# Patient Record
Sex: FEMALE | URBAN - METROPOLITAN AREA
[De-identification: names, ages, dates, MRNs, and addresses within clinical notes are randomized per-mention and may not be internally consistent; named-entity substitution may affect disease eponyms.]

---

## 2023-05-30 ENCOUNTER — TELEPHONE (OUTPATIENT)
Dept: NEUROLOGY | Facility: CLINIC | Age: 73
End: 2023-05-30

## 2023-05-30 NOTE — TELEPHONE ENCOUNTER
I called patient LVM to patient to call back to schedule appointment with Dr Logan in [facility]

## 2023-06-01 NOTE — TELEPHONE ENCOUNTER
Patient called back and spoke with me directly  Scheduled for 7/6 at 9am with Dr Bala Wakefield  She is going to bring her records with her however she does not have the records from [facility], but provided me with the fax number to submit the request  She is going to try to get the images from [facility] and bring them along to the appt so that Dr Bala Wakefield can review them  I will also send a request with the medical records request as well   Faxing request to [phone]

## 2023-06-01 NOTE — TELEPHONE ENCOUNTER
I called the patient to offer her an appt with Dr Burt Gamboa for next week  No answer  If the patient calls back please offer the spot on hold but patient MUST bring in records for Dr Burt Gamboa to be able to review as we have no information currently in the chart

## 2023-06-06 ENCOUNTER — OFFICE VISIT (OUTPATIENT)
Dept: NEUROLOGY | Facility: CLINIC | Age: 73
End: 2023-06-06
Payer: MEDICARE

## 2023-06-06 VITALS
BODY MASS INDEX: 43.15 KG/M2 | SYSTOLIC BLOOD PRESSURE: 181 MMHG | TEMPERATURE: 97 F | HEIGHT: 57 IN | DIASTOLIC BLOOD PRESSURE: 77 MMHG | WEIGHT: 200 LBS | HEART RATE: 85 BPM

## 2023-06-06 DIAGNOSIS — D83.9 COMMON VARIABLE IMMUNODEFICIENCY (HCC): ICD-10-CM

## 2023-06-06 DIAGNOSIS — I10 PRIMARY HYPERTENSION: ICD-10-CM

## 2023-06-06 DIAGNOSIS — I63.81 OTHER CEREBRAL INFARCTION DUE TO OCCLUSION OR STENOSIS OF SMALL ARTERY (HCC): ICD-10-CM

## 2023-06-06 DIAGNOSIS — R42 DIZZINESS AND GIDDINESS: ICD-10-CM

## 2023-06-06 DIAGNOSIS — E78.2 MIXED DYSLIPIDEMIA: ICD-10-CM

## 2023-06-06 DIAGNOSIS — Z86.73 HISTORY OF STROKE: Primary | ICD-10-CM

## 2023-06-06 PROBLEM — R26.9 NEUROLOGIC GAIT DYSFUNCTION: Status: ACTIVE | Noted: 2022-02-15

## 2023-06-06 PROCEDURE — 99205 OFFICE O/P NEW HI 60 MIN: CPT | Performed by: PSYCHIATRY & NEUROLOGY

## 2023-06-06 RX ORDER — PANTOPRAZOLE SODIUM 40 MG/1
TABLET, DELAYED RELEASE ORAL
COMMUNITY
Start: 2023-02-27

## 2023-06-06 RX ORDER — NISOLDIPINE 34 MG/1
34 TABLET, FILM COATED, EXTENDED RELEASE ORAL
COMMUNITY
Start: 2023-05-18

## 2023-06-06 RX ORDER — ASPIRIN 81 MG/1
81 TABLET, CHEWABLE ORAL DAILY
Qty: 30 TABLET | Refills: 0
Start: 2023-06-06

## 2023-06-06 RX ORDER — CHOLECALCIFEROL (VITAMIN D3) 25 MCG
CAPSULE ORAL
COMMUNITY

## 2023-06-06 RX ORDER — ATORVASTATIN CALCIUM 40 MG/1
40 TABLET, FILM COATED ORAL DAILY
Qty: 90 TABLET | Refills: 3 | Status: SHIPPED | OUTPATIENT
Start: 2023-06-06

## 2023-06-06 RX ORDER — COLESEVELAM 180 1/1
625 TABLET ORAL 2 TIMES DAILY
COMMUNITY
Start: 2023-03-21

## 2023-06-06 RX ORDER — LORAZEPAM 0.5 MG/1
TABLET ORAL
COMMUNITY

## 2023-06-06 RX ORDER — LEVOTHYROXINE SODIUM 175 UG/1
175 TABLET ORAL DAILY
COMMUNITY

## 2023-06-06 RX ORDER — FLUOXETINE HYDROCHLORIDE 20 MG/1
CAPSULE ORAL
COMMUNITY
Start: 2023-05-18

## 2023-06-06 RX ORDER — PREDNISONE 5 MG/1
5 TABLET ORAL
COMMUNITY

## 2023-06-06 RX ORDER — ATORVASTATIN CALCIUM 20 MG/1
20 TABLET, FILM COATED ORAL DAILY
COMMUNITY
Start: 2023-03-21 | End: 2023-06-06 | Stop reason: SDUPTHER

## 2023-06-06 RX ORDER — TRAMADOL HYDROCHLORIDE 50 MG/1
TABLET ORAL
COMMUNITY

## 2023-06-06 RX ORDER — IRBESARTAN 150 MG/1
150 TABLET ORAL DAILY
COMMUNITY

## 2023-06-06 RX ORDER — BACLOFEN 20 MG/1
20 TABLET ORAL
COMMUNITY

## 2023-06-06 RX ORDER — PRIMIDONE 50 MG/1
50 TABLET ORAL DAILY
COMMUNITY

## 2023-06-06 NOTE — PROGRESS NOTES
Patient ID: Edna Salgado is a 74 y o  female who presents to the [facility]  Assessment/Plan:   Patient Instructions   Stroke: Denisse Martins presents for an initial consultation with regard to an incidentally discovered stroke  This stroke was incidentally discovered on a recent head CT and I was able to look at that I directly compared to the CT scan from September 2022 and agree that it was already present and at this time is a stable  The stroke does appear as a scar, therefore it was already chronic in September 2022 and there is no clear way to know exactly how old the stroke is per se  The size and location is most consistent with a stroke due to small vessel ischemic disease which is related potentially to high cholesterol and high blood pressure  She does have a history of immunodeficient state and is on immune modulating medications that can increase the overall risk for stroke however I do not at this point in time to have specific evidence that the stroke was related to medication that she takes or to the immunodeficient state itself which can also increase the risk for blood clots to a degree  She also reports intermittent episodes of dizziness described as more of a head fullness/lightheaded sensation with a degree of depersonalization  This occurs most often with changes in position   -For stroke prevention at this point I would like her to begin aspirin 81 mg/day which is over-the-counter  Her most recent LDL cholesterol was 81 which is typically quite excellent but for someone with a history of stroke we would recommend an LDL cholesterol of less than 70  For that reason I have asked her to increase her Lipitor to 40 mg at this time and to repeat the cholesterol panel in 3 months    Note that she is currently in the midst of an adjustment to her blood pressure medication and she can wait to adjust the Lipitor until her blood pressure medication is stable and she knows how she is feeling  -In the future we will defer to her primary care team for monitoring of her cholesterol panel and blood sugar numbers and in addition to LDL of less than 70 would recommend hemoglobin A1c of less than 7%  -She should check her blood pressure once per day and overall we would recommend targeting a blood pressure of less than 130/80 most of the time  If the pressure is frequently higher than that she should work with her primary care team to slowly reduce it down  She should also remain well-hydrated while adjusting her blood pressure medications  -I will request a one-time carotid Doppler ultrasound initially to look for any evidence of carotid artery stenosis  -With regard to her dizziness, although it is possible that this is coming from her medications or her inner ears the lack of consistency from 1 week to the next, and the lack of vertigo, would suggest that this may be more of a pressure issue  I pressure issue causing symptoms like this can come from significantly elevated blood pressure, relatively low blood pressure, or abnormal heart rhythm  In addition to monitoring her blood pressure and the change in the frequency of her dizzy spells with the blood pressure medication I will request a Holter monitor at this time  -I will also provide her a referral to see one of the cardiologist at [facility]  -If she finds that she has persistent episodes of dizziness with no clear relation to blood pressure or heart rhythm we will consider vestibular rehabilitation in the future  -From a neurologic standpoint she would be cleared to have a cochlear implant performed at the discretion of her surgeon and in concert with her other physicians  -I would like for her to keep track of dizzy spells that she experiences    Because they cluster, keeping track of them using a calendar or note on her phone will help us to figure out if there are any specific patterns we can use diagnostically or therapeutically  I will plan for her to return to the office in 4 months time to see me directly but would be happy to see her sooner if the need should arise  If she has any symptoms concerning for TIA or stroke including sudden painless loss of vision or double vision, difficulty speaking or swallowing, vertigo/room spinning that does not quickly resolve, or weakness/numbness/loss of coordination affecting 1 side of the face or body she should proceed by ambulance to the nearest emergency room immediately  Diagnoses and all orders for this visit:    History of stroke  -     AMB extended holter monitor; Future  -     VAS carotid complete study; Future  -     atorvastatin (LIPITOR) 40 mg tablet; Take 1 tablet (40 mg total) by mouth daily  -     Lipid Panel with Direct LDL reflex; Future  -     aspirin 81 mg chewable tablet; Chew 1 tablet (81 mg total) daily  -     Ambulatory Referral to Cardiology; Future    Dizziness and giddiness  -     AMB extended holter monitor; Future  -     VAS carotid complete study; Future  -     Ambulatory Referral to Cardiology; Future    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)  -     VAS carotid complete study; Future    Primary hypertension    Common variable immunodeficiency (HCC)    Mixed dyslipidemia  -     atorvastatin (LIPITOR) 40 mg tablet; Take 1 tablet (40 mg total) by mouth daily  -     Lipid Panel with Direct LDL reflex; Future  -     aspirin 81 mg chewable tablet; Chew 1 tablet (81 mg total) daily    Other orders  -     Discontinue: atorvastatin (LIPITOR) 20 mg tablet; Take 20 mg by mouth daily  -     baclofen 20 mg tablet; Take 20 mg by mouth daily at bedtime  -     Cholecalciferol (Vitamin D High Potency) 25 MCG (1000 UT) capsule  -     colesevelam (WELCHOL) 625 mg tablet;  Take 625 mg by mouth 2 (two) times a day 3 tabs in the AM 3 tabs in the PM  -     FLUoxetine (PROzac) 20 mg capsule  -     irbesartan (AVAPRO) 150 mg tablet; Take 150 mg by mouth daily  -     levothyroxine 175 mcg tablet; Take 175 mcg by mouth daily  -     LORazepam (Ativan) 0 5 mg tablet  -     nisoldipine (SULAR) 34 MG 24 hr tablet; Take 34 mg by mouth daily at bedtime  -     pantoprazole (PROTONIX) 40 mg tablet  -     predniSONE 5 mg tablet; Take 5 mg by mouth  -     primidone (MYSOLINE) 50 mg tablet; Take 50 mg by mouth daily  -     traMADol (Ultram) 50 mg tablet; Take by mouth          Subjective:    ROHITH    Evelio Laguna is a 74 y  o  woman who presents for evaluation of stroke  She reports that she had a recent head CT upon which it was discovered that there was an old left frontal stroke  That stroke was not reported on at the time of her prior head CT in September 2022 however the radiologist on the new head CT confirmed that it was stable appearing compared with the prior study  I was able to personally review the 2 studies and agree that it was present and stable at the time of the prior study  Most likely this was not mentioned because it is reasonably subtle and quite chronic appearing  There is no change compared to the recent scan  We had an extensive discussion with regard to the physiology and pathophysiology of stroke  This stroke is of a size/position/orientation most consistent with small vessel ischemic disease  She does have typical small vessel risk factors  She is unable to have an MRI as a result of cochlear implants  She does have a history of immune deficiency for which reason she is on medication that could increase her thrombotic risk however at this point in time I do not have specific evidence to relate this stroke to that medication and ultimately it is entirely possible that this stroke predates her initiation of such medicine  She describes a history of episodes of dizziness  Episodes tend to cluster happening for a week or 2 and then resolving for a week or 2    They do not include a vertigo component but "more typically a sense of fullness and pressure in the head and a degree of depersonalization  She tends to sit down when they occur and they may be relatively short-lived  They have occasionally happened while sitting but not often  They do not happen when laying down  She also reports episodes of occasionally feeling shaky or [place] which resolve with a snack  She does not currently carry a diagnosis of diabetes  She reports that she lost her  to a glioblastoma and is currently living alone  History reviewed  No pertinent past medical history  Current Outpatient Medications:   •  aspirin 81 mg chewable tablet, Chew 1 tablet (81 mg total) daily, Disp: 30 tablet, Rfl: 0  •  atorvastatin (LIPITOR) 40 mg tablet, Take 1 tablet (40 mg total) by mouth daily, Disp: 90 tablet, Rfl: 3  •  baclofen 20 mg tablet, Take 20 mg by mouth daily at bedtime, Disp: , Rfl:   •  Cholecalciferol (Vitamin D High Potency) 25 MCG (1000 UT) capsule, , Disp: , Rfl:   •  colesevelam (WELCHOL) 625 mg tablet, Take 625 mg by mouth 2 (two) times a day 3 tabs in the AM 3 tabs in the PM, Disp: , Rfl:   •  FLUoxetine (PROzac) 20 mg capsule, , Disp: , Rfl:   •  irbesartan (AVAPRO) 150 mg tablet, Take 150 mg by mouth daily, Disp: , Rfl:   •  levothyroxine 175 mcg tablet, Take 175 mcg by mouth daily, Disp: , Rfl:   •  LORazepam (Ativan) 0 5 mg tablet, , Disp: , Rfl:   •  nisoldipine (SULAR) 34 MG 24 hr tablet, Take 34 mg by mouth daily at bedtime, Disp: , Rfl:   •  pantoprazole (PROTONIX) 40 mg tablet, , Disp: , Rfl:   •  predniSONE 5 mg tablet, Take 5 mg by mouth, Disp: , Rfl:   •  primidone (MYSOLINE) 50 mg tablet, Take 50 mg by mouth daily, Disp: , Rfl:   •  traMADol (Ultram) 50 mg tablet, Take by mouth, Disp: , Rfl:      Objective:    Blood pressure (!) 181/77, pulse 85, temperature (!) 97 °F (36 1 °C), temperature source Temporal, height 4' 9\" (1 448 m), weight 90 7 kg (200 lb)      Neurological Exam    At the time of my " examination she was awake, alert, and in no distress  There were no clear cranial neuropathies  She has a tremor affecting the head more than the arms her speech is not hypophonic or bradykinetic  She was able to rise easily without assistance, manipulate her phone, and ambulate with the assistance of her cane  There is no obvious deficit of extraocular movement  Upon feeling somewhat symptomatic with regard to dizziness her blood pressure was checked and was 182/84 sitting with a pulse of approximately 75 and around 178/75 standing with a pulse of approximately 79  ROS:    Review of Systems   Constitutional: Negative  Negative for appetite change and fever  HENT: Negative  Negative for hearing loss, tinnitus, trouble swallowing and voice change  Eyes: Negative  Negative for photophobia, pain and visual disturbance  Respiratory: Negative  Negative for shortness of breath  Cardiovascular: Negative  Negative for palpitations  Gastrointestinal: Negative  Negative for nausea and vomiting  Endocrine: Negative  Negative for cold intolerance  Genitourinary: Negative  Negative for dysuria, frequency and urgency  Musculoskeletal: Negative  Negative for gait problem, myalgias and neck pain  Skin: Negative  Negative for rash  Allergic/Immunologic: Negative  Neurological: Positive for dizziness, tremors, light-headedness and headaches  Negative for seizures, syncope, facial asymmetry, speech difficulty, weakness and numbness  Hematological: Negative  Does not bruise/bleed easily  Psychiatric/Behavioral: Negative  Negative for confusion, hallucinations and sleep disturbance         I have spent a total time of 76 minutes on 06/06/23 in caring for this patient including Diagnostic results, Prognosis, Risks and benefits of tx options, Instructions for management, Patient and family education, Importance of tx compliance, Risk factor reductions, Impressions, Counseling / Coordination of care, Documenting in the medical record, Reviewing / ordering tests, medicine, procedures   and Obtaining or reviewing history

## 2023-06-06 NOTE — PATIENT INSTRUCTIONS
Stroke: Urvashi Bhatia presents for an initial consultation with regard to an incidentally discovered stroke  This stroke was incidentally discovered on a recent head CT and I was able to look at that I directly compared to the CT scan from September 2022 and agree that it was already present and at this time is a stable  The stroke does appear as a scar, therefore it was already chronic in September 2022 and there is no clear way to know exactly how old the stroke is per se  The size and location is most consistent with a stroke due to small vessel ischemic disease which is related potentially to high cholesterol and high blood pressure  She does have a history of immunodeficient state and is on immune modulating medications that can increase the overall risk for stroke however I do not at this point in time to have specific evidence that the stroke was related to medication that she takes or to the immunodeficient state itself which can also increase the risk for blood clots to a degree  She also reports intermittent episodes of dizziness described as more of a head fullness/lightheaded sensation with a degree of depersonalization  This occurs most often with changes in position   -For stroke prevention at this point I would like her to begin aspirin 81 mg/day which is over-the-counter  Her most recent LDL cholesterol was 81 which is typically quite excellent but for someone with a history of stroke we would recommend an LDL cholesterol of less than 70  For that reason I have asked her to increase her Lipitor to 40 mg at this time and to repeat the cholesterol panel in 3 months    Note that she is currently in the midst of an adjustment to her blood pressure medication and she can wait to adjust the Lipitor until her blood pressure medication is stable and she knows how she is feeling  -In the future we will defer to her primary care team for monitoring of her cholesterol panel and blood sugar numbers and in addition to LDL of less than 70 would recommend hemoglobin A1c of less than 7%  -She should check her blood pressure once per day and overall we would recommend targeting a blood pressure of less than 130/80 most of the time  If the pressure is frequently higher than that she should work with her primary care team to slowly reduce it down  She should also remain well-hydrated while adjusting her blood pressure medications  -I will request a one-time carotid Doppler ultrasound initially to look for any evidence of carotid artery stenosis  -With regard to her dizziness, although it is possible that this is coming from her medications or her inner ears the lack of consistency from 1 week to the next, and the lack of vertigo, would suggest that this may be more of a pressure issue  I pressure issue causing symptoms like this can come from significantly elevated blood pressure, relatively low blood pressure, or abnormal heart rhythm  In addition to monitoring her blood pressure and the change in the frequency of her dizzy spells with the blood pressure medication I will request a Holter monitor at this time  -I will also provide her a referral to see one of the cardiologist at [facility]  -If she finds that she has persistent episodes of dizziness with no clear relation to blood pressure or heart rhythm we will consider vestibular rehabilitation in the future  -From a neurologic standpoint she would be cleared to have a cochlear implant performed at the discretion of her surgeon and in concert with her other physicians  -I would like for her to keep track of dizzy spells that she experiences  Because they cluster, keeping track of them using a calendar or note on her phone will help us to figure out if there are any specific patterns we can use diagnostically or therapeutically      I will plan for her to return to the office in 4 months time to see me directly but would be happy to see her sooner if the need should arise  If she has any symptoms concerning for TIA or stroke including sudden painless loss of vision or double vision, difficulty speaking or swallowing, vertigo/room spinning that does not quickly resolve, or weakness/numbness/loss of coordination affecting 1 side of the face or body she should proceed by ambulance to the nearest emergency room immediately

## 2023-06-23 ENCOUNTER — TELEPHONE (OUTPATIENT)
Dept: NEUROLOGY | Facility: CLINIC | Age: 73
End: 2023-06-23

## 2023-06-23 NOTE — TELEPHONE ENCOUNTER
Patient called looking for Fabricio Griffin in regards to an update on the holter monitor Dr Jacky Burch had ordered for her      Please assist

## 2023-06-26 ENCOUNTER — TELEPHONE (OUTPATIENT)
Dept: NEUROLOGY | Facility: CLINIC | Age: 73
End: 2023-06-26

## 2023-06-26 NOTE — TELEPHONE ENCOUNTER
----- Message from Efe Fox RN sent at 6/21/2023  1:18 PM EDT -----  Regarding: FW: Holter monitor  Contact: [phone]    ----- Message -----  From: Monika Muñoz  Sent: 6/21/2023  12:48 PM EDT  To: Neurology [address] Clinical Team 5  Subject: Holter monitor                                   Dr Kandice Jimenez, it was a francis meeting you  My blood pressure is 134/72 thanks to you! However, I have not heard anything about the holter monitor  How do I get it? Thanks for your help         Virgil Simpson

## 2023-06-26 NOTE — TELEPHONE ENCOUNTER
Returned pt's call regarding pt's message below  Left a detailed message advising Dr Ricky Nicole placed a order for an extended Holter Monitor  Left the number for Central Scheduling        Sent a [employer] message

## 2023-06-26 NOTE — TELEPHONE ENCOUNTER
Patient called into the office and spoke with me directly  She stated she is trying to schedule the holter monitor that Dr Hakan Massey ordered but central scheduling does not see the order  I did check the charts and called central scheduling with the patient on the line  The rep stated that the order was for cardiology and the patient would need to call cardiology and set up an appt with them and they do the holter monitor testing thru their office  Provided the number [phone] for the patient to schedule with cardiology  Patient was also asking if she should still get the Echo that was ordered by her other cardiologist and is scheduled on August 9th  Advised the patient that we currently do not have an echo ordered for her and if she would want to get the testing done thru [facility] we would have to have an order to schedule and it may be scheduled later than what she already has scheduled  Informed the patient if she would like to get the testing done as scheduled already she can have them send the results to us or bring them to her next visit

## 2023-06-27 ENCOUNTER — TELEPHONE (OUTPATIENT)
Dept: NEUROLOGY | Facility: CLINIC | Age: 73
End: 2023-06-27

## 2023-06-27 NOTE — TELEPHONE ENCOUNTER
Ilean Primrose called back and I spoke with her directly  Informed of which images are needed  CT Head 5/24/2023 and CT Head/Brain/C spine from 9/8/2022  Sent a new request via Fax for [employer]

## 2023-06-27 NOTE — TELEPHONE ENCOUNTER
Fax found in [address] from [employer] from a fax that I had sent on 6/1/2023 for the patient to obtain images  Fax was illegible and radiology unable to read  I did call the number on the forms and spoke with Amber Coreas who stated that I would need to talk to [facility] to have the images sent to us  She took my name and direct number and stated she would have [facility] call me back later today

## 2023-07-07 ENCOUNTER — TELEPHONE (OUTPATIENT)
Dept: NEUROLOGY | Facility: CLINIC | Age: 73
End: 2023-07-07

## 2023-07-07 NOTE — TELEPHONE ENCOUNTER
RENETTA left at 12:33, shashank'd at 16:06:    Hi this is Maddie Gutierrez. I am a patient of Dr. Ramos's. He is treating me for history of Stroke.  My YOB: 1950, and I'm not feeling well today. A little bit Short of Breath. My blood pressure is 134/54. I just like to have a nurse call me. Thank you.   Oh, and I'm kind of pale, and my gums are white. If that means anything thanks, bye.   # [phone]    I called patient back, I reached her Answering Machine.  I left message advising patient  go to ED or call 911 due to her history and present symptoms.     otis-Dr. Ramos

## 2023-07-27 ENCOUNTER — TELEPHONE (OUTPATIENT)
Dept: NEUROLOGY | Facility: CLINIC | Age: 73
End: 2023-07-27

## 2023-07-27 NOTE — TELEPHONE ENCOUNTER
Pt called in to let Dr. Lary Pool know she is going for heart surgery tomorrow and wanted to know if she needed to be seen sooner or if Dr. Lary Pool wanted to change her plan of action moving forward but also stated she wouldn't know more till after her surgery. I advised her to have the records sent over for him to review, and to give us a call again when she is feeling better to give us more information and we can see if we need to move the appointment.

## 2023-08-01 ENCOUNTER — TELEPHONE (OUTPATIENT)
Dept: NEUROLOGY | Facility: CLINIC | Age: 73
End: 2023-08-01

## 2023-08-01 NOTE — TELEPHONE ENCOUNTER
LMOM for patient to call us back to reschedule her appt with Jett Galicia on 10/9 because he will not be in the office that day.

## 2023-08-02 ENCOUNTER — TELEPHONE (OUTPATIENT)
Dept: NEUROLOGY | Facility: CLINIC | Age: 73
End: 2023-08-02

## 2023-08-02 NOTE — TELEPHONE ENCOUNTER
Received a call from patient saying she had emergency heart surgery on 7/28 and needs to know if Dr. Tahir Palmer wants to see her sooner. She also mentioned there was several tests/consults he ordered and wants to talk to someone about that.

## 2023-08-08 NOTE — TELEPHONE ENCOUNTER
So likely we will need some information about what the procedure is and what they are planning for her post-operatively.  Typically I would expect any med changes related to protection during and after cardiac surgery like this to be appropriate for stroke protection in most cases at least.     Likely would not require a change in her appointment unless there is some neurologic symptoms following her surgery.

## 2023-08-08 NOTE — TELEPHONE ENCOUNTER
Reviewed chart. There are two encounters in Epic regarding this task. In the telephone note from 7/27/23, Dr. Jett Galicia responded:  "So likely we will need some information about what the procedure is and what they are planning for her post-operatively.  Typically I would expect any med changes related to protection during and after cardiac surgery like this to be appropriate for stroke protection in most cases at least.     Likely would not require a change in her appointment unless there is some neurologic symptoms following her surgery."     Called patient to obtain more information and to relay Dr. James Chris response. Patient did not answer. Left a voice message requesting for a return call. Provided the office's phone number.

## 2023-08-09 NOTE — TELEPHONE ENCOUNTER
Returned call to the patient. No answer. Left a voice message requesting for a return call to provide more detailed requiring the cardiac surgery.  Provided the office's phone number

## 2023-08-09 NOTE — TELEPHONE ENCOUNTER
Recd pete 8/8 taken off 8/9   my name is [address]  [birth year]. I got a call from I believe  Etta Correa to call you back, I guess regarding my emergency heart surgery.  So if someone give me a call back,   cb [phone]

## 2023-08-11 NOTE — TELEPHONE ENCOUNTER
Recd vm 8-10-23 3:38pm; Taken off 8-11-23 8::15am    Patient returning our call. Please call her back.     Cb# [phone]

## 2023-08-14 NOTE — TELEPHONE ENCOUNTER
Called patient. No answer. Left a voice message requesting for a return call. Provided the office's phone number.

## 2023-08-17 ENCOUNTER — TELEPHONE (OUTPATIENT)
Dept: NEUROLOGY | Facility: CLINIC | Age: 73
End: 2023-08-17

## 2023-09-05 ENCOUNTER — PATIENT MESSAGE (OUTPATIENT)
Dept: NEUROLOGY | Facility: CLINIC | Age: 73
End: 2023-09-05

## 2023-09-11 ENCOUNTER — HOSPITAL ENCOUNTER (OUTPATIENT)
Dept: NON INVASIVE DIAGNOSTICS | Facility: CLINIC | Age: 73
Discharge: HOME | End: 2023-09-11
Payer: MEDICARE

## 2023-09-11 DIAGNOSIS — I63.81 OTHER CEREBRAL INFARCTION DUE TO OCCLUSION OR STENOSIS OF SMALL ARTERY (HCC): ICD-10-CM

## 2023-09-11 DIAGNOSIS — Z86.73 HISTORY OF STROKE: ICD-10-CM

## 2023-09-11 DIAGNOSIS — R42 DIZZINESS AND GIDDINESS: ICD-10-CM

## 2023-09-11 PROCEDURE — 93880 EXTRACRANIAL BILAT STUDY: CPT

## 2023-09-11 PROCEDURE — 93880 EXTRACRANIAL BILAT STUDY: CPT | Performed by: SURGERY

## 2023-09-13 NOTE — RESULT ENCOUNTER NOTE
LETICIA Ames Sun,    Your carotid arteries look great! No narrowing or plaque!     Thanks!    -Dr Ricardo Denise

## 2023-09-14 ENCOUNTER — HOSPITAL ENCOUNTER (EMERGENCY)
Facility: HOSPITAL | Age: 73
Discharge: HOME | End: 2023-09-15
Attending: EMERGENCY MEDICINE
Payer: MEDICARE

## 2023-09-14 ENCOUNTER — APPOINTMENT (EMERGENCY)
Dept: CT IMAGING | Facility: HOSPITAL | Age: 73
End: 2023-09-14
Payer: MEDICARE

## 2023-09-14 DIAGNOSIS — R19.7 DIARRHEA: Primary | ICD-10-CM

## 2023-09-14 LAB
ALBUMIN SERPL BCP-MCNC: 4.6 G/DL (ref 3.5–5)
ALP SERPL-CCNC: 86 U/L (ref 34–104)
ALT SERPL W P-5'-P-CCNC: 22 U/L (ref 7–52)
ANION GAP SERPL CALCULATED.3IONS-SCNC: 7 MMOL/L
AST SERPL W P-5'-P-CCNC: 25 U/L (ref 13–39)
BASOPHILS # BLD AUTO: 0.07 THOUSANDS/ÂΜL (ref 0–0.1)
BASOPHILS NFR BLD AUTO: 1 % (ref 0–1)
BILIRUB SERPL-MCNC: 0.49 MG/DL (ref 0.2–1)
BUN SERPL-MCNC: 16 MG/DL (ref 5–25)
CALCIUM SERPL-MCNC: 11.3 MG/DL (ref 8.4–10.2)
CHLORIDE SERPL-SCNC: 105 MMOL/L (ref 96–108)
CO2 SERPL-SCNC: 26 MMOL/L (ref 21–32)
CREAT SERPL-MCNC: 0.74 MG/DL (ref 0.6–1.3)
EOSINOPHIL # BLD AUTO: 0.16 THOUSAND/ÂΜL (ref 0–0.61)
EOSINOPHIL NFR BLD AUTO: 2 % (ref 0–6)
ERYTHROCYTE [DISTWIDTH] IN BLOOD BY AUTOMATED COUNT: 17.4 % (ref 11.6–15.1)
GFR SERPL CREATININE-BSD FRML MDRD: 80 ML/MIN/1.73SQ M
GLUCOSE SERPL-MCNC: 91 MG/DL (ref 65–140)
HCT VFR BLD AUTO: 38.1 % (ref 34.8–46.1)
HGB BLD-MCNC: 12.3 G/DL (ref 11.5–15.4)
IMM GRANULOCYTES # BLD AUTO: 0.02 THOUSAND/UL (ref 0–0.2)
IMM GRANULOCYTES NFR BLD AUTO: 0 % (ref 0–2)
LIPASE SERPL-CCNC: 23 U/L (ref 11–82)
LYMPHOCYTES # BLD AUTO: 2.68 THOUSANDS/ÂΜL (ref 0.6–4.47)
LYMPHOCYTES NFR BLD AUTO: 26 % (ref 14–44)
MAGNESIUM SERPL-MCNC: 1.9 MG/DL (ref 1.9–2.7)
MCH RBC QN AUTO: 27.9 PG (ref 26.8–34.3)
MCHC RBC AUTO-ENTMCNC: 32.3 G/DL (ref 31.4–37.4)
MCV RBC AUTO: 86 FL (ref 82–98)
MONOCYTES # BLD AUTO: 0.79 THOUSAND/ÂΜL (ref 0.17–1.22)
MONOCYTES NFR BLD AUTO: 8 % (ref 4–12)
NEUTROPHILS # BLD AUTO: 6.68 THOUSANDS/ÂΜL (ref 1.85–7.62)
NEUTS SEG NFR BLD AUTO: 63 % (ref 43–75)
NRBC BLD AUTO-RTO: 0 /100 WBCS
PLATELET # BLD AUTO: 260 THOUSANDS/UL (ref 149–390)
PMV BLD AUTO: 9.7 FL (ref 8.9–12.7)
POTASSIUM SERPL-SCNC: 3.9 MMOL/L (ref 3.5–5.3)
PROT SERPL-MCNC: 7.5 G/DL (ref 6.4–8.4)
RBC # BLD AUTO: 4.41 MILLION/UL (ref 3.81–5.12)
SODIUM SERPL-SCNC: 138 MMOL/L (ref 135–147)
WBC # BLD AUTO: 10.4 THOUSAND/UL (ref 4.31–10.16)

## 2023-09-14 PROCEDURE — 74176 CT ABD & PELVIS W/O CONTRAST: CPT

## 2023-09-14 PROCEDURE — 80053 COMPREHEN METABOLIC PANEL: CPT | Performed by: EMERGENCY MEDICINE

## 2023-09-14 PROCEDURE — 99284 EMERGENCY DEPT VISIT MOD MDM: CPT | Performed by: EMERGENCY MEDICINE

## 2023-09-14 PROCEDURE — 93005 ELECTROCARDIOGRAM TRACING: CPT

## 2023-09-14 PROCEDURE — 36415 COLL VENOUS BLD VENIPUNCTURE: CPT | Performed by: EMERGENCY MEDICINE

## 2023-09-14 PROCEDURE — 99284 EMERGENCY DEPT VISIT MOD MDM: CPT

## 2023-09-14 PROCEDURE — 83690 ASSAY OF LIPASE: CPT | Performed by: EMERGENCY MEDICINE

## 2023-09-14 PROCEDURE — 85025 COMPLETE CBC W/AUTO DIFF WBC: CPT | Performed by: EMERGENCY MEDICINE

## 2023-09-14 PROCEDURE — 96360 HYDRATION IV INFUSION INIT: CPT

## 2023-09-14 PROCEDURE — 83735 ASSAY OF MAGNESIUM: CPT | Performed by: EMERGENCY MEDICINE

## 2023-09-14 RX ADMIN — SODIUM CHLORIDE 1000 ML: 0.9 INJECTION, SOLUTION INTRAVENOUS at 18:44

## 2023-09-14 NOTE — ED PROVIDER NOTES
Pt Name: Cathy Weeks  MRN: [id number]  [address] [place] 1950  Age/Sex: 74 y.o. female  Date of evaluation: 9/14/2023  PCP: Shailesh Chavez MD    [address]    Chief Complaint   Patient presents with   • Diarrhea     Diarrhea on going for 10 days, dizziness, nausea, unable to eat anything. Sent in by PCP for evaluation         HPI and MDM    74 y.o. female presenting with diarrhea for the last 10 days. Patient states she is having lightheadedness, dizziness, nausea. No vomiting. Poor appetite. States every time she eats she gets diarrhea. Denies any blood in her stool. No abdominal pain. No fevers or chills, no recent traveling. States she took 3 Imodium to get to the hospital.      Differential diagnosis considered includes but not limited to electrolyte abnormalities, dehydration, acute kidney injury, diverticulitis, colitis, infectious diarrhea. Per my independent interpretation of EKG, normal sinus rhythm heart of 73, narrow QRS, normal axis, intervals reassuring, no STEMI. Blood work is overall reassuring, mildly elevated calcium level, likely due to dehydration, patient received IV fluids. CT scan results as below. She was updated with results. CT findings were concerning for panniculitis, however upon direct inspection of the region, no tenderness or erythema or signs of inflammation were noted. No signs of infection. Patient unable to provide stool sample while in the emergency department. Advised continued supportive care, close PCP follow-up, return precaution discussed, patient verbalized understanding and is in agreement with plan.           Medications   sodium chloride 0.9 % bolus 1,000 mL (1,000 mL Intravenous New Bag 9/14/23 2344)         Past Medical and Surgical History    Past Medical History:   Diagnosis Date   • Hypothyroid        Past Surgical History:   Procedure Laterality Date   • FL MYELOGRAM CERVICAL  07/16/2015   • FL MYELOGRAM CERVICAL  06/17/2013   • REPLACEMENT AORTIC VALVE TRANSCATHETER (TAVR) N/A 07/28/2023       Family History   Problem Relation Age of Onset   • Heart disease Mother    • Heart disease Father    • Stroke Father    • Heart disease Brother    • Heart disease Maternal Grandmother    • Heart disease Paternal Grandfather        Social History     Tobacco Use   • Smoking status: Former     Types: Cigarettes   Vaping Use   • Vaping Use: Never used   Substance Use Topics   • Alcohol use: Yes     Comment: social   • Drug use: Never           Allergies    Allergies   Allergen Reactions   • Alcohol Gel Base - Food Allergy Swelling   • Infliximab Shortness Of Breath   • Iodine - Food Allergy Hives and Itching     Any kind of Iodine  Topical iodine     • Tobramycin Swelling     Eye drops  Eye drops  Eyes turn bright red  Eye drops  Eyes turn bright red  Eye drops     • Other Hives   • Shellfish Allergy - Food Allergy Hives     Nausea and abdominal pain   • Sulfa Antibiotics Hives   • Sulfamethoxazole-Trimethoprim Hives       Home Medications    Prior to Admission medications    Medication Sig Start Date End Date Taking?  Authorizing Provider   aspirin 81 mg chewable tablet Chew 1 tablet (81 mg total) daily 6/6/23   Trever Martinez MD   atorvastatin (LIPITOR) 40 mg tablet Take 1 tablet (40 mg total) by mouth daily 6/6/23   Trever Martinez MD   baclofen 20 mg tablet Take 20 mg by mouth daily at bedtime    Historical Provider, MD   Cholecalciferol (Vitamin D High Potency) 25 MCG (1000 UT) capsule     Historical Provider, MD [facility]) 625 mg tablet Take 625 mg by mouth 2 (two) times a day 3 tabs in the AM 3 tabs in the PM 3/21/23   Historical Provider, MD   FLUoxetine (PROzac) 20 mg capsule  5/18/23   Historical Provider, MD   irbesartan (AVAPRO) 150 mg tablet Take 150 mg by mouth daily    Historical Provider, MD   levothyroxine 175 mcg tablet Take 175 mcg by mouth daily    Historical Provider, MD   LORazepam (Ativan) 0.5 mg tablet Historical Provider, MD   nisoldipine (SULAR) 34 MG 24 hr tablet Take 34 mg by mouth daily at bedtime 5/18/23   Historical Provider, MD   pantoprazole (PROTONIX) 40 mg tablet  2/27/23   Historical Provider, MD   predniSONE 5 mg tablet Take 5 mg by mouth    Historical Provider, MD   primidone (MYSOLINE) 50 mg tablet Take 50 mg by mouth daily    Historical Provider, MD   traMADol (Ultram) 50 mg tablet Take by mouth    Historical Provider, MD           Physical Exam      ED Triage Vitals   Temperature Pulse Respirations Blood Pressure SpO2   09/14/23 1826 09/14/23 1825 09/14/23 1825 09/14/23 1825 09/14/23 1825   (!) 97.4 °F (36.3 °C) 95 18 154/70 96 %      Temp Source Heart Rate Source Patient Position - Orthostatic VS BP Location FiO2 (%)   09/14/23 1826 09/14/23 1825 09/14/23 1825 09/14/23 1825 --   Oral Monitor Sitting Right arm       Pain Score       --                      Physical Exam  Constitutional:       General: She is not in acute distress. Appearance: She is not ill-appearing. HENT:      Head: Normocephalic and atraumatic. Nose: Nose normal.      Mouth/Throat:      Mouth: Mucous membranes are dry. Eyes:      Extraocular Movements: Extraocular movements intact. Pupils: Pupils are equal, round, and reactive to light. Cardiovascular:      Rate and Rhythm: Normal rate and regular rhythm. Pulmonary:      Effort: No respiratory distress. Breath sounds: Normal breath sounds. No wheezing. Abdominal:      General: There is no distension. Palpations: Abdomen is soft. Tenderness: There is no abdominal tenderness. Musculoskeletal:         General: No deformity. Cervical back: Normal range of motion and neck supple. Skin:     General: Skin is warm. Findings: No erythema. Neurological:      Mental Status: She is alert and oriented to person, place, and time. Mental status is at baseline.               Diagnostic Results      Labs:    Results Reviewed     Procedure Component Value Units Date/Time    Comprehensive metabolic panel [[id number]]  (Abnormal) Collected: 09/14/23 1845    Lab Status: Final result Specimen: Blood from Arm, Left Updated: 09/14/23 1922     Sodium 138 mmol/L      Potassium 3.9 mmol/L      Chloride 105 mmol/L      CO2 26 mmol/L      ANION GAP 7 mmol/L      BUN 16 mg/dL      Creatinine 0.74 mg/dL      Glucose 91 mg/dL      Calcium 11.3 mg/dL      AST 25 U/L      ALT 22 U/L      Alkaline Phosphatase 86 U/L      Total Protein 7.5 g/dL      Albumin 4.6 g/dL      Total Bilirubin 0.49 mg/dL      eGFR 80 ml/min/1.73sq m     Narrative:      National Kidney Disease Foundation guidelines for Chronic Kidney Disease (CKD):   •  Stage 1 with normal or high GFR (GFR > 90 mL/min/1.73 square meters)  •  Stage 2 Mild CKD (GFR = 60-89 mL/min/1.73 square meters)  •  Stage 3A Moderate CKD (GFR = 45-59 mL/min/1.73 square meters)  •  Stage 3B Moderate CKD (GFR = 30-44 mL/min/1.73 square meters)  •  Stage 4 Severe CKD (GFR = 15-29 mL/min/1.73 square meters)  •  Stage 5 End Stage CKD (GFR <15 mL/min/1.73 square meters)  Note: GFR calculation is accurate only with a steady state creatinine    Magnesium [[id number]]  (Normal) Collected: 09/14/23 1845    Lab Status: Final result Specimen: Blood from Arm, Left Updated: 09/14/23 1922     Magnesium 1.9 mg/dL     Lipase [[id number]]  (Normal) Collected: 09/14/23 1845    Lab Status: Final result Specimen: Blood from Arm, Left Updated: 09/14/23 1922     Lipase 23 u/L     CBC and differential [[id number]]  (Abnormal) Collected: 09/14/23 1845    Lab Status: Final result Specimen: Blood from Arm, Left Updated: 09/14/23 1853     WBC 10.40 Thousand/uL      RBC 4.41 Million/uL      Hemoglobin 12.3 g/dL      Hematocrit 38.1 %      MCV 86 fL      MCH 27.9 pg      MCHC 32.3 g/dL      RDW 17.4 %      MPV 9.7 fL      Platelets 706 Thousands/uL      nRBC 0 /100 WBCs      Neutrophils Relative 63 %      Immat GRANS % 0 %      Lymphocytes Relative 26 % Monocytes Relative 8 %      Eosinophils Relative 2 %      Basophils Relative 1 %      Neutrophils Absolute 6.68 Thousands/µL      Immature Grans Absolute 0.02 Thousand/uL      Lymphocytes Absolute 2.68 Thousands/µL      Monocytes Absolute 0.79 Thousand/µL      Eosinophils Absolute 0.16 Thousand/µL      Basophils Absolute 0.07 Thousands/µL     UA (URINE) with reflex to Scope [[id number]]     Lab Status: No result Specimen: Urine     Clostridium difficile toxin by PCR with EIA [[id number]]     Lab Status: No result Specimen: Stool from Per Rectum     Stool Enteric Bacterial Panel by PCR [[id number]]     Lab Status: No result Specimen: Stool           All labs reviewed and utilized in the medical decision making process    Radiology:    CT abdomen pelvis wo contrast   Final Result      Gastric wall thickening, which can be seen in the setting of gastritis. CT findings suggestive of panniculitis. Correlate with direct inspection of the inferior abdominal wall            Workstation performed: [id number]             All radiology studies independently viewed by me and interpreted by the radiologist.    Procedure    Procedures        FINAL IMPRESSION    Final diagnoses:   Diarrhea         DISPOSITION    Time reflects when diagnosis was documented in both MDM as applicable and the Disposition within this note     Time User Action Codes Description Comment    9/15/2023 12:12 AM Chandler Barajas Add [R19.7] Diarrhea       ED Disposition     ED Disposition   Discharge    Condition   Stable    Date/Time   Fri Sep 15, 2023 12:12 AM    Comment   Kit Jefferson discharge to home/self care.                Follow-up Information     Follow up With Specialties Details Why Contact Info    Gifty Gonzalez MD  Call in 1 day  [address] [facility]  [phone]              PATIENT REFERRED TO:    Gifty Gonzalez MD  [address] [place] [address]  [phone]    Call in 1 day        DISCHARGE MEDICATIONS:    Patient's Medications   Discharge Prescriptions    No medications on file       No discharge procedures on file. Laly Lemus DO        This note was partially completed using voice recognition technology, and was scanned for gross errors; however some errors may still exist. Please contact the author with any questions or requests for clarification.       Laly Lemus DO  09/15/23 8001 Patient

## 2023-09-15 VITALS
TEMPERATURE: 97.4 F | RESPIRATION RATE: 20 BRPM | DIASTOLIC BLOOD PRESSURE: 74 MMHG | SYSTOLIC BLOOD PRESSURE: 181 MMHG | OXYGEN SATURATION: 96 % | HEART RATE: 64 BPM

## 2023-09-15 LAB
ATRIAL RATE: 73 BPM
P AXIS: 54 DEGREES
PR INTERVAL: 162 MS
QRS AXIS: 34 DEGREES
QRSD INTERVAL: 90 MS
QT INTERVAL: 394 MS
QTC INTERVAL: 434 MS
T WAVE AXIS: 20 DEGREES
VENTRICULAR RATE: 73 BPM

## 2023-09-15 PROCEDURE — 93010 ELECTROCARDIOGRAM REPORT: CPT | Performed by: INTERNAL MEDICINE

## 2023-10-12 ENCOUNTER — OFFICE VISIT (OUTPATIENT)
Dept: NEUROLOGY | Facility: CLINIC | Age: 73
End: 2023-10-12
Payer: MEDICARE

## 2023-10-12 ENCOUNTER — TELEPHONE (OUTPATIENT)
Dept: NEUROLOGY | Facility: CLINIC | Age: 73
End: 2023-10-12

## 2023-10-12 VITALS
SYSTOLIC BLOOD PRESSURE: 142 MMHG | TEMPERATURE: 97.3 F | HEART RATE: 66 BPM | HEIGHT: 69 IN | DIASTOLIC BLOOD PRESSURE: 62 MMHG | BODY MASS INDEX: 29.06 KG/M2 | WEIGHT: 196.2 LBS

## 2023-10-12 DIAGNOSIS — I10 PRIMARY HYPERTENSION: ICD-10-CM

## 2023-10-12 DIAGNOSIS — E78.2 MIXED DYSLIPIDEMIA: ICD-10-CM

## 2023-10-12 DIAGNOSIS — R26.9 NEUROLOGIC GAIT DYSFUNCTION: ICD-10-CM

## 2023-10-12 DIAGNOSIS — G62.9 NEUROPATHY: ICD-10-CM

## 2023-10-12 DIAGNOSIS — Z86.73 HISTORY OF STROKE: Primary | ICD-10-CM

## 2023-10-12 PROCEDURE — 99215 OFFICE O/P EST HI 40 MIN: CPT | Performed by: PSYCHIATRY & NEUROLOGY

## 2023-10-12 NOTE — PROGRESS NOTES
Patient ID: John Reza is a 74 y.o. female. Assessment/Plan:   Patient Instructions   Stroke: Arvind Fontenot presents for a follow-up evaluation with regard to her prior incidentally discovered stroke. She has been very medically active since she was last in the office. Overall she is doing very well from a stroke standpoint.  -At this time we would plan to continue her combination of aspirin, Lipitor, and appropriate blood pressure and glycemic control  -On the 40 mg of Lipitor her cholesterol panel is now in the desired range so we will plan to continue at that dose and in the future we will defer monitoring of her cholesterol and blood sugar numbers to her primary care team.  We would recommend targeting an LDL cholesterol of less than 70 and hemoglobin A1c of less than 7% on an ongoing basis  -She has now had several rounds of Holter monitoring associated with her recent surgeries and admissions, and in spite of dizzy spells there has been no arrhythmia detected so she does not need additional cardiac monitoring from my standpoint  -Her carotid Doppler ultrasound shows no plaque in the carotid arteries  -She is done a great job of monitoring her blood pressure which is somewhat on the high side. Importantly, while she was in the hospital she noted that the high blood pressure caused her to feel unwell and she felt better when it was under reasonable control. She should continue to check her blood pressure, preferably roughly an hour after she takes her morning blood pressure medicine. Once she has at least a few days or a week worth of numbers she should contact her primary care team to see if she requires an adjustment in her medicine.   Overall we would recommend targeting blood pressure less than 130/80 most of the time  -She is now using a walker and receiving physical therapy, and thankfully is having less with regard to issues of balance or dizziness.  -She did have 1 more significant episode of depersonalization, but that was one episode which was self-limited, and was prior to the gastroenterology interventions in her stomach. I will plan for her to follow-up with me directly in 6 months but we would be happy to see her sooner if the need should arise. If she were to have strokelike symptoms such as sudden painless loss of vision or double vision, difficulty speaking or swallowing, vertigo/room spinning that does not quickly resolve, or weakness/numbness/loss of coordination affecting 1 side of the face or body she should proceed by ambulance to the nearest emergency room immediately. Diagnoses and all orders for this visit:    History of stroke    Primary hypertension    Neuropathy    Mixed dyslipidemia    Neurologic gait dysfunction           Subjective:    HPI  Have you had any new stroke like symptoms that were not previously present (Sudden loss of vision, difficulty speaking or swallowing,  vertigo/room spinning that did not quickly resolve, weakness or numbness affecting one side of the body)? no    Are you taking all of your medications as prescribed? Yes    Any side effects including bleeding/bruising? No    Since her last visit in the office Fartun Noble has been pretty busy. She had her carotid Doppler ultrasound which showed no evidence of plaque or stenosis. She did have her left cochlear implant placed however she also was found to have a problem with her aortic valve and required a TAVR. She did have 5 days of Holter monitoring prior to the procedure. Postprocedure subsequently she was doing well but was found to have significant ongoing diarrhea. She then had an episode approximately 3 weeks ago wherein she experienced an episode of extreme depersonalization. She was on the phone talking to an appointment scheduled her for one of her doctors at the time and was told to call 911. She did have a headache afterwards but does not describe any concurrent headache.   She knew she might throw up or have diarrhea so she attempted to get to the bathroom but was unable to do so. By the time EMS arrived her symptoms had resolved entirely apparently although she was found to be somewhat hypoxic. Hospital evaluation did not reveal a specific or clear cause per se. She subsequently had an endoscopy and colonoscopy performed which showed colon polyps as well as bleeding polyps in the stomach both of which were removed. Notably she was quite anemic when she had her cardiac surgery with a presenting hemoglobin of 6 requiring 2 units of packed red cells. Subsequently her hemoglobin has been better and she has been less dizzy after the abdominal intervention. She continues to experience significant diarrhea and the iron supplementation makes it worse so she is not able to do that. She is getting monthly B12 injections. She asked if her current episode could be consistent with seizure but that is quite unlikely. She also confirms no personal or family history of seizure, no concussions that were severe enough to cause loss of consciousness, no prior meningitis/encephalitis. Because she had some worry about seizure she was not certain if she should be driving her grandchildren. I did provide some reassurance that at this point in time I do not have a high suspicion that she is at high risk for seizure or any additional cerebrovascular events. She is following appropriate measures to lower her risk is much as possible with regard to cerebrovascular disease. Her more recent cholesterol panel shows that her LDL is now in the desired range      Past Medical History:   Diagnosis Date   • Hypothyroid        Social History     Socioeconomic History   • Marital status:       Spouse name: None   • Number of children: None   • Years of education: None   • Highest education level: None   Occupational History   • None   Tobacco Use   • Smoking status: Former     Types: Cigarettes   • Smokeless tobacco: None   Vaping Use   • Vaping Use: Never used   Substance and Sexual Activity   • Alcohol use: Yes     Comment: social   • Drug use: Never   • Sexual activity: None   Other Topics Concern   • None   Social History Narrative   • None     Social Determinants of Health     Financial Resource Strain: Not on file   Food Insecurity: Not on file   Transportation Needs: Not on file   Physical Activity: Not on file   Stress: Not on file   Social Connections: Not on file   Intimate Partner Violence: Not on file   Housing Stability: Not on file         Current Outpatient Medications:   •  aspirin 81 mg chewable tablet, Chew 1 tablet (81 mg total) daily, Disp: 30 tablet, Rfl: 0  •  atorvastatin (LIPITOR) 40 mg tablet, Take 1 tablet (40 mg total) by mouth daily, Disp: 90 tablet, Rfl: 3  •  Cholecalciferol (Vitamin D High Potency) 25 MCG (1000 UT) capsule, , Disp: , Rfl:   •  colesevelam (WELCHOL) 625 mg tablet, Take 625 mg by mouth 2 (two) times a day 3 tabs in the AM 3 tabs in the PM, Disp: , Rfl:   •  FLUoxetine (PROzac) 20 mg capsule, , Disp: , Rfl:   •  irbesartan (AVAPRO) 150 mg tablet, Take 150 mg by mouth daily, Disp: , Rfl:   •  levothyroxine 175 mcg tablet, Take 175 mcg by mouth daily, Disp: , Rfl:   •  LORazepam (Ativan) 0.5 mg tablet, , Disp: , Rfl:   •  nisoldipine (SULAR) 34 MG 24 hr tablet, Take 34 mg by mouth daily at bedtime, Disp: , Rfl:   •  pantoprazole (PROTONIX) 40 mg tablet, , Disp: , Rfl:   •  predniSONE 5 mg tablet, Take 5 mg by mouth, Disp: , Rfl:   •  primidone (MYSOLINE) 50 mg tablet, Take 50 mg by mouth daily, Disp: , Rfl:   •  traMADol (Ultram) 50 mg tablet, Take by mouth, Disp: , Rfl:     Allergies   Allergen Reactions   • Alcohol Gel Base - Food Allergy Swelling   • Infliximab Shortness Of Breath   • Iodine - Food Allergy Hives and Itching     Any kind of Iodine  Topical iodine     • Tobramycin Swelling     Eye drops  Eye drops  Eyes turn bright red  Eye drops  Eyes turn bright red  Eye drops     • Other Hives   • Shellfish Allergy - Food Allergy Hives     Nausea and abdominal pain   • Sulfa Antibiotics Hives   • Sulfamethoxazole-Trimethoprim Hives                  Objective:    Blood pressure 142/62, pulse 66, temperature (!) 97.3 °F (36.3 °C), temperature source Temporal, height 5' 9" (1.753 m), weight 89 kg (196 lb 3.2 oz). Physical Exam    Neurological Exam    At the time of my examination she was awake and alert and in no distress. She is an excellent historian. Cranial nerves II through XII are symmetrically intact although she did have a no-no head tremor. This is with the exception of decreased hearing in her right ear compared with the left however she has a hearing aid in the right ear and a cochlear implant in the left. There is no drift and no significant postural tremor, finger-nose reveals minimal dysmetria/intention tremor of the bilateral upper extremities, she has intact upper extremity proprioceptive function. Strength was symmetric in the bilateral upper and right lower extremities with decreased strength in the iliopsoas on the left. Sensation was somewhat diminished in the left leg compared with the right. Those changes in strength/sensation reported to be the result of a prior lumbar fusion with a nerve injury. She was able to rise without assistance and with her walker the gait is stable. ROS:    Review of Systems   Constitutional:  Negative for appetite change, fatigue and fever. HENT: Negative. Negative for hearing loss, tinnitus, trouble swallowing and voice change. Eyes: Negative. Negative for photophobia, pain and visual disturbance. Respiratory: Negative. Negative for shortness of breath. Cardiovascular: Negative. Negative for palpitations. Gastrointestinal: Negative. Negative for nausea and vomiting. Endocrine: Negative. Negative for cold intolerance. Genitourinary: Negative. Negative for dysuria, frequency and urgency. Musculoskeletal:  Negative for back pain, gait problem, myalgias and neck pain. Skin: Negative. Negative for rash. Allergic/Immunologic: Negative. Neurological:  Positive for dizziness. Negative for tremors, seizures, syncope, facial asymmetry, speech difficulty, weakness, light-headedness, numbness and headaches. Hematological: Negative. Does not bruise/bleed easily. Psychiatric/Behavioral: Negative. Negative for confusion, hallucinations and sleep disturbance. I have spent a total time of 48 minutes on 10/12/23 in caring for this patient including Diagnostic results, Prognosis, Risks and benefits of tx options, Instructions for management, Patient and family education, Importance of tx compliance, Risk factor reductions, Impressions, Counseling / Coordination of care, Documenting in the medical record, Reviewing / ordering tests, medicine, procedures  , and Obtaining or reviewing history  .

## 2023-10-12 NOTE — PATIENT INSTRUCTIONS
Stroke: Tierney Hill presents for a follow-up evaluation with regard to her prior incidentally discovered stroke. She has been very medically active since she was last in the office. Overall she is doing very well from a stroke standpoint.  -At this time we would plan to continue her combination of aspirin, Lipitor, and appropriate blood pressure and glycemic control  -On the 40 mg of Lipitor her cholesterol panel is now in the desired range so we will plan to continue at that dose and in the future we will defer monitoring of her cholesterol and blood sugar numbers to her primary care team.  We would recommend targeting an LDL cholesterol of less than 70 and hemoglobin A1c of less than 7% on an ongoing basis  -She has now had several rounds of Holter monitoring associated with her recent surgeries and admissions, and in spite of dizzy spells there has been no arrhythmia detected so she does not need additional cardiac monitoring from my standpoint  -Her carotid Doppler ultrasound shows no plaque in the carotid arteries  -She is done a great job of monitoring her blood pressure which is somewhat on the high side. Importantly, while she was in the hospital she noted that the high blood pressure caused her to feel unwell and she felt better when it was under reasonable control. She should continue to check her blood pressure, preferably roughly an hour after she takes her morning blood pressure medicine. Once she has at least a few days or a week worth of numbers she should contact her primary care team to see if she requires an adjustment in her medicine.   Overall we would recommend targeting blood pressure less than 130/80 most of the time  -She is now using a walker and receiving physical therapy, and thankfully is having less with regard to issues of balance or dizziness.  -She did have 1 more significant episode of depersonalization, but that was one episode which was self-limited, and was prior to the gastroenterology interventions in her stomach. I will plan for her to follow-up with me directly in 6 months but we would be happy to see her sooner if the need should arise. If she were to have strokelike symptoms such as sudden painless loss of vision or double vision, difficulty speaking or swallowing, vertigo/room spinning that does not quickly resolve, or weakness/numbness/loss of coordination affecting 1 side of the face or body she should proceed by ambulance to the nearest emergency room immediately.

## 2023-10-27 ENCOUNTER — TELEPHONE (OUTPATIENT)
Dept: NEUROLOGY | Facility: CLINIC | Age: 73
End: 2023-10-27

## 2023-10-27 NOTE — TELEPHONE ENCOUNTER
[facility] 2[facility]58 pm -   Pt left a  message stating that at her last OV, Dr. Morteza Mendoza  had recommended increasing her Avapro to 300 mg, and had suggested that pt contact her PCP regarding refilling the higher dose. Pt had stated that her PCP is on vacation and she was calling to see if Dr. Morteza Mendoza would send a script to [address] [place] [address]. Pt reported that she will have no medication left as of 10/27.    _____________________________________________________________________________________________________    Per OV notes of 10/12/23:  "-She is done a great job of monitoring her blood pressure which is somewhat on the high side. Importantly, while she was in the hospital she noted that the high blood pressure caused her to feel unwell and she felt better when it was under reasonable control. She should continue to check her blood pressure, preferably roughly an hour after she takes her morning blood pressure medicine. Once she has at least a few days or a week worth of numbers she should contact her primary care team to see if she requires an adjustment in her medicine. Overall we would recommend targeting blood pressure less than 130/80 most of the time"    Called pt back to get more information. No answer, and had to messages at both phone numbers requesting a return call. Want to find out if pt is monitoring her BP, what the values are, if she contacted her PCP, and if she did increase the Avapro as she stated in the message. Routed to Dr. Morteza Mendoza to make him aware that pt states that she is out of the Avapro as of today.

## 2023-10-30 NOTE — TELEPHONE ENCOUNTER
Princess Solis MD  You; Neurology [facility] Clinical Team 414 minutes ago (8:02 AM)       So the last note was actually that we had wanted her to continue to monitor her BP and get those numbers to her PCP so that a decision can be made about the medication. If her PCP is unavailable if she could please give us the numbers from her home BP monitoring I can make an initial adjustment in the med for her and in the future her PCP can take it back over. ..     Thanks

## 2023-10-30 NOTE — TELEPHONE ENCOUNTER
Spoke with pt. She is following up with her PCP regarding her blood pressure. PCP has scheduled her to see cardiology. Pt stated that she did get a shipment of Avapro mailed to her on Saturday. She is taking 300 mg daily and will follow up with PCP/cardiology. Nothing further is needed at this time.

## 2023-10-30 NOTE — TELEPHONE ENCOUNTER
received vm from 10/27 at 3:03pm- I this is Livan Alvarez returning your call. You returned my call about um, I need a refill on my prescription for avapro 300 milligrams, it's the generic, but i can't pronounce. my birth date is [birth year]. And I'll be here all afternoon, i'd appreciate if you call me back because I'm gonna run out of them today. Thank you so much.  [phone].  -------------------------------------------  Already addressed

## 2023-11-13 ENCOUNTER — CONSULT (OUTPATIENT)
Dept: CARDIOLOGY CLINIC | Facility: CLINIC | Age: 73
End: 2023-11-13
Payer: MEDICARE

## 2023-11-13 VITALS
SYSTOLIC BLOOD PRESSURE: 158 MMHG | HEART RATE: 86 BPM | DIASTOLIC BLOOD PRESSURE: 81 MMHG | BODY MASS INDEX: 29.33 KG/M2 | HEIGHT: 69 IN | OXYGEN SATURATION: 98 % | RESPIRATION RATE: 16 BRPM | WEIGHT: 198 LBS

## 2023-11-13 DIAGNOSIS — Z95.2 HISTORY OF TRANSCATHETER AORTIC VALVE REPLACEMENT (TAVR): ICD-10-CM

## 2023-11-13 DIAGNOSIS — R42 DIZZINESS AND GIDDINESS: ICD-10-CM

## 2023-11-13 DIAGNOSIS — Z86.73 HISTORY OF STROKE: ICD-10-CM

## 2023-11-13 DIAGNOSIS — I10 PRIMARY HYPERTENSION: Primary | ICD-10-CM

## 2023-11-13 PROCEDURE — 99204 OFFICE O/P NEW MOD 45 MIN: CPT | Performed by: INTERNAL MEDICINE

## 2023-11-13 RX ORDER — IRBESARTAN 300 MG/1
300 TABLET ORAL
Qty: 30 TABLET | Refills: 12 | Status: SHIPPED | OUTPATIENT
Start: 2023-11-13

## 2023-11-13 RX ORDER — GABAPENTIN 400 MG/1
CAPSULE ORAL
COMMUNITY
Start: 2023-10-18

## 2023-11-13 NOTE — PATIENT INSTRUCTIONS
Check blood pressure after sitting for 5 minutes and record  date and blood pressure  Bring BP record with you

## 2023-11-13 NOTE — PROGRESS NOTES
Cardiology Consultation     Claudine Maldonado  [id number]  1950  [facility] [address] [place] [address]    HPI:  70-year-old  who lives alone who has had multiple medical problems. From a cardiac standpoint, she had severe shortness of breath earlier this year and was found to have aortic stenosis and had a TAVR. At that time she was told that she had only a 50% blockage of 1 artery and no stenting was necessary. She also has had high blood pressure for a long time and has also had hypercholesterolemia. She was started on atorvastatin and her LDL has come down from 1 41-81. She has had falls and had a concussion at 1 point and a CAT scan showed evidence of an old stroke. There were no symptoms associated with this. She has been hospitalized twice and monitored and there has been no evidence of any arrhythmia and she denies irregular heartbeat. She does not get exertionally related chest discomfort relieved by rest.    She did smoke many years ago. At home her blood pressure has been somewhat elevated but she does not always sit for 5 minutes before checking her blood pressure. 1. Primary hypertension  -     irbesartan (AVAPRO) 300 mg tablet; Take 1 tablet (300 mg total) by mouth daily at bedtime    2. Dizziness and giddiness  -     Ambulatory Referral to Cardiology    3. History of stroke  -     Ambulatory Referral to Cardiology    4.  History of transcatheter aortic valve replacement (TAVR)      Patient Active Problem List   Diagnosis    Bilateral sensorineural hearing loss    Common variable immunodeficiency ([address])    Current chronic use of systemic steroids    Malignant melanoma of skin ([address])    Mixed dyslipidemia    Fibromyalgia    Neurologic gait dysfunction    Neuropathy    Primary hypertension    Rheumatoid arthritis ([address])    History of stroke     Past Medical History: Diagnosis Date    Hypothyroid      Social History     Socioeconomic History    Marital status:       Spouse name: Not on file    Number of children: Not on file    Years of education: Not on file    Highest education level: Not on file   Occupational History    Not on file   Tobacco Use    Smoking status: Former     Types: Cigarettes    Smokeless tobacco: Not on file   Vaping Use    Vaping Use: Never used   Substance and Sexual Activity    Alcohol use: Yes     Comment: social    Drug use: Never    Sexual activity: Not on file   Other Topics Concern    Not on file   Social History Narrative    Not on file     Social Determinants of Health     Financial Resource Strain: Not on file   Food Insecurity: Not on file   Transportation Needs: Not on file   Physical Activity: Not on file   Stress: Not on file   Social Connections: Not on file   Intimate Partner Violence: Not on file   Housing Stability: Not on file      Family History   Problem Relation Age of Onset    Heart disease Mother     Heart disease Father     Stroke Father     Heart disease Brother     Heart disease Maternal Grandmother     Heart disease Paternal Grandfather      Past Surgical History:   Procedure Laterality Date    FL MYELOGRAM CERVICAL  07/16/2015    FL MYELOGRAM CERVICAL  06/17/2013    REPLACEMENT AORTIC VALVE TRANSCATHETER (TAVR) N/A 07/28/2023       Current Outpatient Medications:     aspirin 81 mg chewable tablet, Chew 1 tablet (81 mg total) daily, Disp: 30 tablet, Rfl: 0    atorvastatin (LIPITOR) 40 mg tablet, Take 1 tablet (40 mg total) by mouth daily, Disp: 90 tablet, Rfl: 3    Cholecalciferol (Vitamin D High Potency) 25 MCG (1000 UT) capsule, , Disp: , Rfl:     colesevelam (WELCHOL) 625 mg tablet, Take 625 mg by mouth 2 (two) times a day 3 tabs in the AM 3 tabs in the PM, Disp: , Rfl:     FLUoxetine (PROzac) 20 mg capsule, Pt reported taking 40 mg, Disp: , Rfl:     gabapentin (NEURONTIN) 400 mg capsule, , Disp: , Rfl:     irbesartan (AVAPRO) 300 mg tablet, Take 1 tablet (300 mg total) by mouth daily at bedtime, Disp: 30 tablet, Rfl: 12    levothyroxine 175 mcg tablet, Take 175 mcg by mouth daily, Disp: , Rfl:     LORazepam (Ativan) 0.5 mg tablet, , Disp: , Rfl:     nisoldipine (SULAR) 34 MG 24 hr tablet, Take 34 mg by mouth daily at bedtime, Disp: , Rfl:     pantoprazole (PROTONIX) 40 mg tablet, , Disp: , Rfl:     predniSONE 5 mg tablet, Take 5 mg by mouth, Disp: , Rfl:     primidone (MYSOLINE) 50 mg tablet, Take 50 mg by mouth daily, Disp: , Rfl:     traMADol (Ultram) 50 mg tablet, Take by mouth, Disp: , Rfl:   Allergies   Allergen Reactions    Alcohol Gel Base - Food Allergy Swelling    Infliximab Shortness Of Breath    Iodine - Food Allergy Hives and Itching     Any kind of Iodine  Topical iodine      Tobramycin Swelling     Eye drops  Eye drops  Eyes turn bright red  Eye drops  Eyes turn bright red  Eye drops      Other Hives    Shellfish Allergy - Food Allergy Hives     Nausea and abdominal pain    Sulfa Antibiotics Hives    Sulfamethoxazole-Trimethoprim Hives     Vitals:    11/13/23 1616   BP: 158/81   BP Location: Left arm   Patient Position: Sitting   Cuff Size: Standard   Pulse: 86   Resp: 16   SpO2: 98%   Weight: 89.8 kg (198 lb)   Height: 5' 9" (1.753 m)       Labs:  Admission on 09/14/2023, Discharged on 09/15/2023   Component Date Value    Ventricular Rate 09/14/2023 73     Atrial Rate 09/14/2023 73     GA Interval 09/14/2023 162     QRSD Interval 09/14/2023 90     QT Interval 09/14/2023 394     QTC Interval 09/14/2023 434     P Axis 09/14/2023 54     QRS Axis 09/14/2023 34     T Wave [place] 09/14/2023 20     WBC 09/14/2023 10.40 (H)     RBC 09/14/2023 4.41     Hemoglobin 09/14/2023 12.3     Hematocrit 09/14/2023 38.1     MCV 09/14/2023 86     MCH 09/14/2023 27.9     MCHC 09/14/2023 32.3     RDW 09/14/2023 17.4 (H)     MPV 09/14/2023 9.7     Platelets 96/18/4675 260     nRBC 09/14/2023 0     Neutrophils Relative 09/14/2023 63 Immat GRANS % 09/14/2023 0     Lymphocytes Relative 09/14/2023 26     Monocytes Relative 09/14/2023 8     Eosinophils Relative 09/14/2023 2     Basophils Relative 09/14/2023 1     Neutrophils Absolute 09/14/2023 6.68     Immature Grans Absolute 09/14/2023 0.02     Lymphocytes Absolute 09/14/2023 2.68     Monocytes Absolute 09/14/2023 0.79     Eosinophils Absolute 09/14/2023 0.16     Basophils Absolute 09/14/2023 0.07     Sodium 09/14/2023 138     Potassium 09/14/2023 3.9     Chloride 09/14/2023 105     CO2 09/14/2023 26     ANION GAP 09/14/2023 7     BUN 09/14/2023 16     Creatinine 09/14/2023 0.74     Glucose 09/14/2023 91     Calcium 09/14/2023 11.3 (H)     AST 09/14/2023 25     ALT 09/14/2023 22     Alkaline Phosphatase 09/14/2023 86     Total Protein 09/14/2023 7.5     Albumin 09/14/2023 4.6     Total Bilirubin 09/14/2023 0.49     eGFR 09/14/2023 80     Magnesium 09/14/2023 1.9     Lipase 09/14/2023 23      Imaging: No results found. Review of Systems:    #1.  Bilateral hearing difficulty  2. History of cervical fusion  3. Multiple low back surgeries  4. Anemia of uncertain cause  5. History of "bleeding polyps"    Physical Exam:  She is overweight. Blood pressure 148/77. Skin reveals previous surgical scars. Pupils equal.  Hearing is diminished. Rhythm regular. Grade 2/6 systolic ejection murmur at the base. No organomegaly. Signs of venous insufficiency but no edema. Good strength in extremities. EKG in September 23 was within normal limits. Discussion/Summary:    1. History of TAVR  2. Hypertension  3. History of asymptomatic stroke  4. Anemia possibly secondary to polyps which have been removed  5. 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 Keep a record of blood pressure at home after sitting for 5 minutes  2. Increase Avapro to 300 mg daily and continue Sular  3. Patient states that the [place] is very expensive-consider change when she returns  4. Return 3 months.             Morena Meyers MD

## 2024-01-12 ENCOUNTER — TELEPHONE (OUTPATIENT)
Dept: NEUROLOGY | Facility: CLINIC | Age: 74
End: 2024-01-12

## 2024-01-12 NOTE — TELEPHONE ENCOUNTER
1ST ATTEMPT,     Called pt no answer, LMOM ON BOTH NUMBERS, CELL AND HOME NA LMOM ON BOTH     Thank you,     Brenda          Regarding: Dizziness   Contact: [phone]  ----- Message from Evita Walden RN sent at 1/12/2024 10:41 AM EST -----  Good Morning,  Please contact pt to schedule an ASAP OV, per Dr. Ramos's recommendations. It can be either with a stroke AP or Dr. Ramos. Thank you!     ----- Message sent from Evita Walden RN to Maddie Gutierrez at 1/12/2024 10:39 AM -----   Hi Maddie,    Please see Dr. Ramos's response below. I am going to get a message out to our clerical team to contact you and schedule an office visit. If you have any new or different symptoms that are concerning for stroke, such as facial droop, speech difficulty, one sided weakness, or vision issues please go to the nearest emergency room by ambulance. Thank you!    Alfie Ramos MD  1/11/24  2:56 PM  So generally TIA presents with a single episode and then is done, it should not occur in multiple episodes.  That being said, clearly these symptoms are significant for Maddie and whether this is Vertigo due to TIA, or due to an inner ear issue, or some other problem it sounds like she should be seen.      ----- Message -----       From:Maddie Brenda       Sent:1/8/2024  4:29 PM EST         To:Alfie Ramos    Subject:Dizziness     I have been experiencing dizziness, headaches, lightheadedness and nausea.  Last Thursday I went to the local ER and my head CT scan and bloodwork were normal.  I was sent home with codeine for the headaches which, obviously, I haven’t taken.   Many doctors have told me it’s vertigo but I’m concerned it could be TIA episodes also.  Should I see Dr. Ramos?  This is seriously affecting my quality of life.  Thanks for your help.

## 2024-01-22 ENCOUNTER — OFFICE VISIT (OUTPATIENT)
Dept: NEUROLOGY | Facility: CLINIC | Age: 74
End: 2024-01-22
Payer: MEDICARE

## 2024-01-22 VITALS
HEIGHT: 57 IN | HEART RATE: 92 BPM | SYSTOLIC BLOOD PRESSURE: 148 MMHG | OXYGEN SATURATION: 98 % | WEIGHT: 195.9 LBS | TEMPERATURE: 97.9 F | DIASTOLIC BLOOD PRESSURE: 80 MMHG | RESPIRATION RATE: 18 BRPM | BODY MASS INDEX: 42.26 KG/M2

## 2024-01-22 DIAGNOSIS — Z86.73 HISTORY OF STROKE: ICD-10-CM

## 2024-01-22 DIAGNOSIS — I10 PRIMARY HYPERTENSION: ICD-10-CM

## 2024-01-22 DIAGNOSIS — G25.0 ESSENTIAL TREMOR: Primary | ICD-10-CM

## 2024-01-22 DIAGNOSIS — Z91.041 ALLERGY TO IODINATED CONTRAST: ICD-10-CM

## 2024-01-22 DIAGNOSIS — R42 VERTIGO: ICD-10-CM

## 2024-01-22 PROCEDURE — 99215 OFFICE O/P EST HI 40 MIN: CPT | Performed by: PSYCHIATRY & NEUROLOGY

## 2024-01-22 RX ORDER — PROPRANOLOL HYDROCHLORIDE 10 MG/1
10 TABLET ORAL 3 TIMES DAILY
Qty: 90 TABLET | Refills: 3 | Status: SHIPPED | OUTPATIENT
Start: 2024-01-22

## 2024-01-22 RX ORDER — METHYLPREDNISOLONE 32 MG/1
32 TABLET ORAL DAILY
Qty: 2 TABLET | Refills: 0 | Status: SHIPPED | OUTPATIENT
Start: 2024-01-22

## 2024-01-22 RX ORDER — DIPHENHYDRAMINE HCL 50 MG
CAPSULE ORAL
Qty: 1 CAPSULE | Refills: 0 | Status: SHIPPED | OUTPATIENT
Start: 2024-01-22

## 2024-01-22 NOTE — PROGRESS NOTES
Patient ID: Maddie Gutierrez is a 73 y.o. female.    Assessment/Plan:  Patient is a 73-year-old female with history of bilateral sensorineural hearing loss s/p left cochlear implant, common variable immunodeficiency, fibromyalgia, CVA, malignant melanoma, dyslipidemia, neurologic gait dysfunction, neuropathy, primary hypertension and rheumatoid arthritis who presents for stroke follow-up. Maddie is a patient of Dr. Ramos. She was last seen by him on 10/12/23.    Today she presents with multiple concerns including dizziness, headaches and tremor.    In summary, she has been having a head spinning sensation for many years that waxes and wanes.  They can be triggered by standing up from a chair too fast and turning her head/body too quickly.  Dizziness is associated with nausea and appetite loss.  Symptoms can happen all together or separately.  She does respond well to meclizine and Zofran.  She has been working with PT in the past and Eligio-Hallpike was negative.  She does report having neck pain and numbness and tingling of the hands and feet.  Patient has also been on primidone for her history of essential tremor.  On physical exam today patient with a clear head-bobbing tremor and action tremor.Patient  also reports headaches that started last summer.  These are a daily bilateral temporal ache that radiates into the jaw.  She believes that stress is a trigger.  She reports clenching her teeth at night.  She has a mouthguard however her new dentist told her not to use it.  She has been taking Tylenol and tramadol at bedtime.  Denies any other migrainous features.    Patient reports that both headaches and dizziness are worse with higher blood pressure.  This is managed by cardiologist whom she'll see in March.     Impression: At this time it seems like patient's headaches and dizziness are both pressure dependent.  I would like to take a closer look at her blood vessels to assess for structural deficits.  Especially  given her history of multiple vascular risk factors.  Primidone can also have a side effect of dizziness and based on exam today, does not seem to be helping her tremor. As she reports being symptomatic with high BP, may benefit from better BP control.     Plan:  Decrease the primidone to half tab for one week and then stop   Start propranolol 10 mg:  prescription sent for 10 mg 3 times a day  Start with 10 mg in the am  If tolerated can increase to 2 times a day then 3 times a day   Advised to take note of side effects and record BP after every dose   Patient advised to stop the medication completely if she gets lightheaded or short of breath  CTA head and neck with and without contrast to evaluate for vascular insufficiency  Allergy prep:  Methylprednisolone 32 mg 12 H prior and 2 H prior   Benadryl 50 mg 1 hour prior   Continue aspirin 81 mg  Continue atorvastatin 40 mg  Monitor blood pressure closely  I discussed symptoms to be aware of that warrant an emergent visit to the ED  Follow-up in 2 months to monitor progress     Diagnoses and all orders for this visit:    Essential tremor  -     propranolol (INDERAL) 10 mg tablet; Take 1 tablet (10 mg total) by mouth 3 (three) times a day    Vertigo  -     CTA head and neck w wo contrast; Future    Allergy to iodinated contrast  -     methylPREDNISolone (MEDROL) 32 MG tablet; Take 1 tablet (32 mg total) by mouth daily Take one 12 hours prior to CTA and one two hours prior to CTA  -     diphenhydrAMINE (BENADRYL) 50 mg capsule; Take 1 tab 60 min prior to scheduled Imaging    History of stroke    Primary hypertension    Other orders  -     Ferric Derisomaltose (MONOFERRIC IV); Inject into a catheter in a vein       Subjective:    Patient is a 73-year-old female with history of bilateral sensorineural hearing loss, common variable immunodeficiency, fibromyalgia, CVA, malignant melanoma, dyslipidemia, neurologic gait dysfunction, neuropathy, primary hypertension and  rheumatoid arthritis who presents for stroke follow-up. Maddie is a patient of Dr. Ramos. She was last seen by him on 10/12/23.    To review, patient was initially seen by Dr. Ramos in June 2023 due to a potential incidentally discovered stroke on CT head.  He had reviewed CT scan from September 22 and at that time the stroke was already present.  Given its size and location it was thought to be consistent with a small vessel ischemic stroke secondary to high blood pressure and high cholesterol.  Patient is immunodeficient due to history of CVID.  Given this, she is on immunomodulating therapy which can increase her risk of stroke however he did not believe that his strokes were secondary to her medications.  For stroke prevention patient was started on aspirin 81 mg, doubled BP medication and cholesterol medications.     At the time patient also reported intermittent episodes of dizziness described as more of a head fullness/lightheadedness sensation with the degree of depersonalization.  This occurred more so often with changes in position.        Workup:    VAS    RIGHT:  There is no evidence of disease throughout the extracranial carotid arteries.  Vertebral artery flow is antegrade.  There is no significant subclavian artery disease.     LEFT:  There is no evidence of disease throughout the extracranial carotid arteries.  Vertebral artery flow is antegrade.  There is no significant subclavian artery disease.      CT C-spine 9/8/2022: there is anterior cervical fusion and discectomy with a metal plate and screws spanning C3-C6.  Alignment is straight.  The C2-3 disc is well preserved.  The C6-7 disc is severely narrowed with vacuum degeneration.     LDL in October 2023  61   May 2023 fasting glucose 101      Interval history:    Patient requested an ASAP visit given that she has been experiencing dizziness, headaches and lightheadedness with nausea.  She went to her local emergency department where she had  "a CT scan and blood work which were normal.  She was sent home with codeine for headaches which she did not take.    Dizziness:  Has been dizzy for many years   Her symptoms come and go   Dizziness is a head spinning sensation   Triggers, standing up from a chair too fast, turning head and body too quickly, when she stands up on her heals   Nausea comes and goes  There are times that symptoms come all together and sometimes separate  Appetite loss when nauseous  Meclizine helps   Zofran helps with the nausea   Did PT, given exercises to look up and down side to side made her symptoms worse   Eligio dye pike was negative   Has neck pain  Numbness and tingling of hands an feet   Does PT for the neck   Cochlear implant doctor told that the procedure can cause dizziness   There are plans to right cochlear implant     Headaches:  Headaches started last summer   Daily   July had a TAVR procedure   Stress with grandchildren   Bilateral and radiates into the jaw   Ache, 3-6/10   Stress   Clenches teeth at night, got a mouthguard, then the new dentist told her not to use it.   Tylenol helps  At night takes tylenol + tramadol   Can last hours to all day.   No photophobia, No phonophobia     Headaches and dizziness are worse with highter BP   Has been keeping track of BP   Has follow up with him in march     Essential tremor:    On primidone         The following portions of the patient's history were reviewed and updated as appropriate: allergies, current medications, past family history, past medical history, past social history, past surgical history, and problem list and review of systems.         Objective:    Blood pressure 148/80, pulse 92, temperature 97.9 °F (36.6 °C), temperature source Temporal, resp. rate 18, height 4' 9\" (1.448 m), weight 88.9 kg (195 lb 14.4 oz), SpO2 98%.    Physical Exam    Neurological Exam      ROS:    Review of Systems    Constitutional:  Negative for appetite change, fatigue and fever. "   HENT: Negative.  Negative for hearing loss, tinnitus, trouble swallowing and voice change.    Eyes: Negative.  Negative for photophobia, pain and visual disturbance.   Respiratory: Negative.  Negative for shortness of breath.    Cardiovascular: Negative.  Negative for palpitations.   Gastrointestinal: Negative.  Negative for nausea and vomiting.   Endocrine: Negative.  Negative for cold intolerance.   Genitourinary: Negative.  Negative for dysuria, frequency and urgency.   Musculoskeletal:  Negative for back pain, gait problem, myalgias, neck pain and neck stiffness.   Skin: Negative.  Negative for rash.   Allergic/Immunologic: Negative.    Neurological:  Positive for weakness (right eye, bottom corner of right mouth). Negative for dizziness, tremors, seizures, syncope, facial asymmetry, speech difficulty, light-headedness, numbness and headaches.   Hematological: Negative.  Does not bruise/bleed easily.   Psychiatric/Behavioral: Negative.  Negative for confusion, hallucinations and sleep disturbance.    All other systems reviewed and are negative.

## 2024-01-22 NOTE — PATIENT INSTRUCTIONS
Decrease the primidone to half tab for one week and then stop   Start propranolol 10 mg:  prescription sent for 10 mg 3 times a day  Start with 10 mg in the am,   If you tolerate can increase to 2 times a day then 3 times a day   Take not of side effects and record BP after every dose   If you get lightheaded and Short of breath stop it completely   CTA head and neck with and without contrast  Allergy prep:   Methylprednisolone 32 mg 12 H prior and 2 H prior    Benadryl 50 mg 1 hour prior

## 2024-01-22 NOTE — PROGRESS NOTES
"Patient ID: Maddie Gutierrez is a 73 y.o. female.    Assessment/Plan:    No problem-specific Assessment & Plan notes found for this encounter.       {Assess/PlanSmartLinks:36854}       Subjective:    HPI    {[facility] HPI texts:19266}    {Common ambulatory SmartLinks:27052}         Objective:    Temperature 97.9 °F (36.6 °C), temperature source Temporal, resp. rate 18, height 4' 9\" (1.448 m), weight 88.9 kg (195 lb 14.4 oz).    Physical Exam    Neurological Exam      ROS:    Review of Systems   Constitutional:  Negative for appetite change, fatigue and fever.   HENT: Negative.  Negative for hearing loss, tinnitus, trouble swallowing and voice change.    Eyes: Negative.  Negative for photophobia, pain and visual disturbance.   Respiratory: Negative.  Negative for shortness of breath.    Cardiovascular: Negative.  Negative for palpitations.   Gastrointestinal: Negative.  Negative for nausea and vomiting.   Endocrine: Negative.  Negative for cold intolerance.   Genitourinary: Negative.  Negative for dysuria, frequency and urgency.   Musculoskeletal:  Negative for back pain, gait problem, myalgias, neck pain and neck stiffness.   Skin: Negative.  Negative for rash.   Allergic/Immunologic: Negative.    Neurological:  Positive for weakness (right eye, bottom corner of right mouth). Negative for dizziness, tremors, seizures, syncope, facial asymmetry, speech difficulty, light-headedness, numbness and headaches.   Hematological: Negative.  Does not bruise/bleed easily.   Psychiatric/Behavioral: Negative.  Negative for confusion, hallucinations and sleep disturbance.    All other systems reviewed and are negative.                  "

## 2024-01-24 PROBLEM — R42 VERTIGO: Status: ACTIVE | Noted: 2024-01-24

## 2024-01-25 ENCOUNTER — TELEPHONE (OUTPATIENT)
Dept: CARDIOLOGY CLINIC | Facility: CLINIC | Age: 74
End: 2024-01-25

## 2024-01-25 ENCOUNTER — PATIENT MESSAGE (OUTPATIENT)
Dept: CARDIOLOGY CLINIC | Facility: CLINIC | Age: 74
End: 2024-01-25

## 2024-01-25 NOTE — TELEPHONE ENCOUNTER
----- Message from Maddie Gutierrez sent at 1/25/2024  7:04 AM EST -----  Regarding: Medicine change  Contact: [phone]  I was seen by Dr. Vero Mota in Neurology in [place].   She would like to add Propranolol 10 mg to my prescription list. I would build up to 3 a day, however, she would like approval from Cardiology first.  My blood pressure has been pretty consistently in the 145/70 range with headaches and dizziness.  Due to my history of stroke and TAVR and  an essential tremor, she feels I should discontinue Primidone when I add the new medication.  Thanks for your help.         Maddie Gutierrez

## 2024-01-26 NOTE — TELEPHONE ENCOUNTER
Called and lvm for pt relaying  response and to give the office a call  back with any questions or concerns.       Sent  message through pt [employer].

## 2024-02-20 ENCOUNTER — PATIENT MESSAGE (OUTPATIENT)
Dept: NEUROLOGY | Facility: CLINIC | Age: 74
End: 2024-02-20

## 2024-02-20 DIAGNOSIS — I63.81 OTHER CEREBRAL INFARCTION DUE TO OCCLUSION OR STENOSIS OF SMALL ARTERY (HCC): Primary | ICD-10-CM

## 2024-02-21 NOTE — PATIENT COMMUNICATION
2/19 at 4:01 pm:    My name is Maddie Gutierrez. My phone number is [phone]. Dr. Henderson ordered a CT scan with dye for me. I've scheduled it for Sunday morning and I just talked to them and they told me I need a blood test for my kidney function. So I need that ordered, I guess in [facility], because I will not have the blood test done at a [facility] facility, and I'd appreciate if you can give me a call back so we can make sure this gets done. Thank you so much for your help. # [phone].

## 2024-02-23 LAB
ALBUMIN SERPL-MCNC: 4.5 G/DL (ref 3.8–4.8)
ALBUMIN/GLOB SERPL: 2.5 {RATIO} (ref 1.2–2.2)
ALP SERPL-CCNC: 104 IU/L (ref 44–121)
ALT SERPL-CCNC: 63 IU/L (ref 0–32)
AST SERPL-CCNC: 42 IU/L (ref 0–40)
BILIRUB SERPL-MCNC: 0.3 MG/DL (ref 0–1.2)
BUN SERPL-MCNC: 16 MG/DL (ref 8–27)
BUN/CREAT SERPL: 25 (ref 12–28)
CALCIUM SERPL-MCNC: 10.7 MG/DL (ref 8.7–10.3)
CHLORIDE SERPL-SCNC: 106 MMOL/L (ref 96–106)
CO2 SERPL-SCNC: 22 MMOL/L (ref 20–29)
CREAT SERPL-MCNC: 0.63 MG/DL (ref 0.57–1)
EGFR: 92 ML/MIN/1.73
GLOBULIN SER-MCNC: 1.8 G/DL (ref 1.5–4.5)
GLUCOSE SERPL-MCNC: 107 MG/DL (ref 70–99)
POTASSIUM SERPL-SCNC: 5.1 MMOL/L (ref 3.5–5.2)
PROT SERPL-MCNC: 6.3 G/DL (ref 6–8.5)
SODIUM SERPL-SCNC: 143 MMOL/L (ref 134–144)

## 2024-02-25 ENCOUNTER — HOSPITAL ENCOUNTER (OUTPATIENT)
Dept: CT IMAGING | Facility: HOSPITAL | Age: 74
Discharge: HOME | End: 2024-02-25
Attending: PSYCHIATRY & NEUROLOGY
Payer: MEDICARE

## 2024-02-25 DIAGNOSIS — R42 VERTIGO: ICD-10-CM

## 2024-02-25 PROCEDURE — G1004 CDSM NDSC: HCPCS

## 2024-02-25 PROCEDURE — 70498 CT ANGIOGRAPHY NECK: CPT

## 2024-02-25 PROCEDURE — 70496 CT ANGIOGRAPHY HEAD: CPT

## 2024-02-25 RX ADMIN — IOHEXOL 85 ML: 350 INJECTION, SOLUTION INTRAVENOUS at 10:58

## 2024-03-11 ENCOUNTER — TELEPHONE (OUTPATIENT)
Dept: NEUROLOGY | Facility: CLINIC | Age: 74
End: 2024-03-11

## 2024-04-22 ENCOUNTER — OFFICE VISIT (OUTPATIENT)
Dept: CARDIOLOGY CLINIC | Facility: CLINIC | Age: 74
End: 2024-04-22
Payer: MEDICARE

## 2024-04-22 VITALS
HEART RATE: 58 BPM | DIASTOLIC BLOOD PRESSURE: 70 MMHG | WEIGHT: 198 LBS | HEIGHT: 57 IN | RESPIRATION RATE: 16 BRPM | BODY MASS INDEX: 42.72 KG/M2 | SYSTOLIC BLOOD PRESSURE: 138 MMHG | OXYGEN SATURATION: 98 %

## 2024-04-22 DIAGNOSIS — Z95.2 HISTORY OF TRANSCATHETER AORTIC VALVE REPLACEMENT (TAVR): ICD-10-CM

## 2024-04-22 DIAGNOSIS — I10 PRIMARY HYPERTENSION: Primary | ICD-10-CM

## 2024-04-22 DIAGNOSIS — I25.10 CORONARY ARTERY DISEASE INVOLVING NATIVE CORONARY ARTERY OF NATIVE HEART WITHOUT ANGINA PECTORIS: ICD-10-CM

## 2024-04-22 PROCEDURE — 99213 OFFICE O/P EST LOW 20 MIN: CPT | Performed by: INTERNAL MEDICINE

## 2024-04-22 RX ORDER — IPRATROPIUM BROMIDE 42 UG/1
SPRAY, METERED NASAL
COMMUNITY
Start: 2024-03-21

## 2024-04-22 RX ORDER — AMLODIPINE BESYLATE 10 MG/1
10 TABLET ORAL DAILY
Qty: 60 TABLET | Refills: 12 | Status: SHIPPED | OUTPATIENT
Start: 2024-04-22

## 2024-04-22 NOTE — PROGRESS NOTES
Cardiology Follow Up    Maddie Gutierrez  1950  [id number]  [facility] [place] [id number] [phone]    1. Primary hypertension  -     amLODIPine (NORVASC) 10 mg tablet; Take 1 tablet (10 mg total) by mouth daily  -     Echo complete w/ contrast if indicated; Future; Expected date: 08/05/2024    2. History of transcatheter aortic valve replacement (TAVR)          Interval History: 73-year-old lady who had a TAVR because of severe shortness of breath and this greatly helped her.  Breathing is much improved.  She had nonobstructive coronary artery disease.    She also has hypertension.  She is on Sular but wishes to go off it because it is very expensive.    Blood pressures at home has been in the 1 30-1 40 range.    She is contemplating cochlear implant surgery.    Patient Active Problem List   Diagnosis    Bilateral sensorineural hearing loss    Common variable immunodeficiency (HCC)    Current chronic use of systemic steroids    Malignant melanoma of skin (HCC)    Mixed dyslipidemia    Fibromyalgia    Neurologic gait dysfunction    Neuropathy    Primary hypertension    Rheumatoid arthritis (HCC)    History of stroke    Essential tremor    Vertigo     Past Medical History:   Diagnosis Date    Hypothyroid      Social History     Socioeconomic History    Marital status:      Spouse name: Not on file    Number of children: Not on file    Years of education: Not on file    Highest education level: Not on file   Occupational History    Not on file   Tobacco Use    Smoking status: Former     Types: Cigarettes    Smokeless tobacco: Not on file   Vaping Use    Vaping status: Never Used   Substance and Sexual Activity    Alcohol use: Yes     Comment: social    Drug use: Never    Sexual activity: Not on file   Other Topics Concern    Not on file   Social History Narrative    Not on file      Social Determinants of Health     Financial Resource Strain: Not on file   Food Insecurity: Not on file   Transportation Needs: Not on file   Physical Activity: Not on file   Stress: Not on file   Social Connections: Not on file   Intimate Partner Violence: Not on file   Housing Stability: Not on file      Family History   Problem Relation Age of Onset    Heart disease Mother     Heart disease Father     Stroke Father     Heart disease Brother     Heart disease Maternal Grandmother     Heart disease Paternal Grandfather      Past Surgical History:   Procedure Laterality Date    FL MYELOGRAM CERVICAL  07/16/2015    FL MYELOGRAM CERVICAL  06/17/2013    REPLACEMENT AORTIC VALVE TRANSCATHETER (TAVR) N/A 07/28/2023       Current Outpatient Medications:     amLODIPine (NORVASC) 10 mg tablet, Take 1 tablet (10 mg total) by mouth daily, Disp: 60 tablet, Rfl: 12    aspirin 81 mg chewable tablet, Chew 1 tablet (81 mg total) daily, Disp: 30 tablet, Rfl: 0    atorvastatin (LIPITOR) 40 mg tablet, Take 1 tablet (40 mg total) by mouth daily, Disp: 90 tablet, Rfl: 3    Cholecalciferol (Vitamin D High Potency) 25 MCG (1000 UT) capsule, , Disp: , Rfl:     colesevelam (WELCHOL) 625 mg tablet, Take 625 mg by mouth 2 (two) times a day 3 tabs in the AM 3 tabs in the PM, Disp: , Rfl:     Cyanocobalamin 1000 MCG/ML KIT, Inject into a muscle, Disp: , Rfl:     diphenhydrAMINE (BENADRYL) 50 mg capsule, Take 1 tab 60 min prior to scheduled Imaging, Disp: 1 capsule, Rfl: 0    Ferric Derisomaltose (MONOFERRIC IV), Inject into a catheter in a vein, Disp: , Rfl:     FLUoxetine (PROzac) 20 mg capsule, Pt reported taking 40 mg, Disp: , Rfl:     gabapentin (NEURONTIN) 400 mg capsule, , Disp: , Rfl:     ipratropium (ATROVENT) 0.06 % nasal spray, , Disp: , Rfl:     irbesartan (AVAPRO) 300 mg tablet, Take 1 tablet (300 mg total) by mouth daily at bedtime, Disp: 30 tablet, Rfl: 12    levothyroxine 175 mcg tablet, Take 175 mcg by mouth daily, Disp: ,  Rfl:     LORazepam (Ativan) 0.5 mg tablet, , Disp: , Rfl:     pantoprazole (PROTONIX) 40 mg tablet, , Disp: , Rfl:     predniSONE 5 mg tablet, Take 5 mg by mouth, Disp: , Rfl:     propranolol (INDERAL) 10 mg tablet, Take 1 tablet (10 mg total) by mouth 3 (three) times a day, Disp: 90 tablet, Rfl: 3    traMADol (Ultram) 50 mg tablet, Take by mouth, Disp: , Rfl:     methylPREDNISolone (MEDROL) 32 MG tablet, Take 1 tablet (32 mg total) by mouth daily Take one 12 hours prior to CTA and one two hours prior to CTA (Patient not taking: Reported on 4/22/2024), Disp: 2 tablet, Rfl: 0  Allergies   Allergen Reactions    Alcohol Gel Base - Food Allergy Swelling    Infliximab Shortness Of Breath    Iodine - Food Allergy Hives and Itching     Any kind of Iodine  Topical iodine      Tobramycin Swelling     Eye drops  Eye drops  Eyes turn bright red  Eye drops  Eyes turn bright red  Eye drops      Other Hives    Shellfish Allergy - Food Allergy Hives     Nausea and abdominal pain    Sulfa Antibiotics Hives    Sulfamethoxazole-Trimethoprim Hives       Labs:  No visits with results within 2 Month(s) from this visit.   Latest known visit with results is:   Orders Only on 02/22/2024   Component Date Value    Glucose, Random 02/22/2024 107 (H)     BUN 02/22/2024 16     Creatinine 02/22/2024 0.63     eGFR 02/22/2024 92     SL AMB BUN/CREATININE RA* 02/22/2024 25     Sodium 02/22/2024 143     Potassium 02/22/2024 5.1     Chloride 02/22/2024 106     CO2 02/22/2024 22     CALCIUM 02/22/2024 10.7 (H)     Protein, Total 02/22/2024 6.3     Albumin 02/22/2024 4.5     Globulin, Total 02/22/2024 1.8     Albumin/Globulin Ratio 02/22/2024 2.5 (H)     TOTAL BILIRUBIN 02/22/2024 0.3     Alk Phos Isoenzymes 02/22/2024 104     AST 02/22/2024 42 (H)     ALT 02/22/2024 63 (H)      Imaging: No results found.    Review of Systems:  Review of Systems    Physical Exam:  She is morbidly obese.  Blood pressure 138/70.  Heart rate 58 and regular.  Lungs clear.   "Rhythm regular.  Grade 2/6 systolic ejection murmur at the base.  No diastolic murmurs.    Discussion/Summary:    1.  Diagnoses as noted above    Recommendations:    1.  DC Sular and start amlodipine 10 mg daily  2.  Keep a record of blood pressures  3.  Follow-up echo in August  4.  Return after echo  5.  She is \"cleared\" for cochlear implant surgery from a cardiovascular standpoint.      Fer Babcock MD  "

## 2024-04-23 ENCOUNTER — TELEPHONE (OUTPATIENT)
Dept: NEUROLOGY | Facility: CLINIC | Age: 74
End: 2024-04-23

## 2024-04-23 NOTE — TELEPHONE ENCOUNTER
Placed call to confirm patients upcoming appointment, Informed patient of the date/time and location. Advised to call office if they need to reschedule their visit.

## 2024-06-07 ENCOUNTER — TELEPHONE (OUTPATIENT)
Dept: NEUROLOGY | Facility: CLINIC | Age: 74
End: 2024-06-07

## 2024-06-07 NOTE — TELEPHONE ENCOUNTER
Pt called to r/s cancelled appt that was on 5/6 at 3 w/Dr Henderson. Accepted next open slot on 6/13 at 1230.

## 2024-06-13 ENCOUNTER — APPOINTMENT (EMERGENCY)
Dept: CT IMAGING | Facility: HOSPITAL | Age: 74
End: 2024-06-13
Payer: MEDICARE

## 2024-06-13 ENCOUNTER — HOSPITAL ENCOUNTER (EMERGENCY)
Facility: HOSPITAL | Age: 74
Discharge: HOME | End: 2024-06-13
Attending: EMERGENCY MEDICINE
Payer: MEDICARE

## 2024-06-13 VITALS
SYSTOLIC BLOOD PRESSURE: 125 MMHG | RESPIRATION RATE: 20 BRPM | DIASTOLIC BLOOD PRESSURE: 60 MMHG | OXYGEN SATURATION: 95 % | HEART RATE: 56 BPM | TEMPERATURE: 97.6 F

## 2024-06-13 DIAGNOSIS — G93.9 BRAIN LESION: ICD-10-CM

## 2024-06-13 DIAGNOSIS — R51.9 ACUTE HEADACHE: Primary | ICD-10-CM

## 2024-06-13 LAB
ANION GAP SERPL CALCULATED.3IONS-SCNC: 5 MMOL/L (ref 4–13)
BASOPHILS # BLD AUTO: 0.06 THOUSANDS/ÂΜL (ref 0–0.1)
BASOPHILS NFR BLD AUTO: 1 % (ref 0–1)
BUN SERPL-MCNC: 23 MG/DL (ref 5–25)
CALCIUM SERPL-MCNC: 10 MG/DL (ref 8.4–10.2)
CARDIAC TROPONIN I PNL SERPL HS: 3 NG/L
CHLORIDE SERPL-SCNC: 109 MMOL/L (ref 96–108)
CO2 SERPL-SCNC: 24 MMOL/L (ref 21–32)
CREAT SERPL-MCNC: 0.64 MG/DL (ref 0.6–1.3)
EOSINOPHIL # BLD AUTO: 0.23 THOUSAND/ÂΜL (ref 0–0.61)
EOSINOPHIL NFR BLD AUTO: 3 % (ref 0–6)
ERYTHROCYTE [DISTWIDTH] IN BLOOD BY AUTOMATED COUNT: 13 % (ref 11.6–15.1)
GFR SERPL CREATININE-BSD FRML MDRD: 87 ML/MIN/1.73SQ M
GLUCOSE SERPL-MCNC: 84 MG/DL (ref 65–140)
HCT VFR BLD AUTO: 39.9 % (ref 34.8–46.1)
HGB BLD-MCNC: 13.3 G/DL (ref 11.5–15.4)
IMM GRANULOCYTES # BLD AUTO: 0.02 THOUSAND/UL (ref 0–0.2)
IMM GRANULOCYTES NFR BLD AUTO: 0 % (ref 0–2)
LYMPHOCYTES # BLD AUTO: 2 THOUSANDS/ÂΜL (ref 0.6–4.47)
LYMPHOCYTES NFR BLD AUTO: 23 % (ref 14–44)
MCH RBC QN AUTO: 30.8 PG (ref 26.8–34.3)
MCHC RBC AUTO-ENTMCNC: 33.3 G/DL (ref 31.4–37.4)
MCV RBC AUTO: 92 FL (ref 82–98)
MONOCYTES # BLD AUTO: 0.99 THOUSAND/ÂΜL (ref 0.17–1.22)
MONOCYTES NFR BLD AUTO: 11 % (ref 4–12)
NEUTROPHILS # BLD AUTO: 5.57 THOUSANDS/ÂΜL (ref 1.85–7.62)
NEUTS SEG NFR BLD AUTO: 62 % (ref 43–75)
NRBC BLD AUTO-RTO: 0 /100 WBCS
PLATELET # BLD AUTO: 197 THOUSANDS/UL (ref 149–390)
PMV BLD AUTO: 9.7 FL (ref 8.9–12.7)
POTASSIUM SERPL-SCNC: 4.4 MMOL/L (ref 3.5–5.3)
RBC # BLD AUTO: 4.32 MILLION/UL (ref 3.81–5.12)
SODIUM SERPL-SCNC: 138 MMOL/L (ref 135–147)
WBC # BLD AUTO: 8.87 THOUSAND/UL (ref 4.31–10.16)

## 2024-06-13 PROCEDURE — 96374 THER/PROPH/DIAG INJ IV PUSH: CPT

## 2024-06-13 PROCEDURE — 96361 HYDRATE IV INFUSION ADD-ON: CPT

## 2024-06-13 PROCEDURE — 96375 TX/PRO/DX INJ NEW DRUG ADDON: CPT

## 2024-06-13 PROCEDURE — 85025 COMPLETE CBC W/AUTO DIFF WBC: CPT | Performed by: EMERGENCY MEDICINE

## 2024-06-13 PROCEDURE — 99284 EMERGENCY DEPT VISIT MOD MDM: CPT

## 2024-06-13 PROCEDURE — 84484 ASSAY OF TROPONIN QUANT: CPT | Performed by: EMERGENCY MEDICINE

## 2024-06-13 PROCEDURE — 80048 BASIC METABOLIC PNL TOTAL CA: CPT | Performed by: EMERGENCY MEDICINE

## 2024-06-13 PROCEDURE — 72125 CT NECK SPINE W/O DYE: CPT

## 2024-06-13 PROCEDURE — 99284 EMERGENCY DEPT VISIT MOD MDM: CPT | Performed by: EMERGENCY MEDICINE

## 2024-06-13 PROCEDURE — 72128 CT CHEST SPINE W/O DYE: CPT

## 2024-06-13 PROCEDURE — 93005 ELECTROCARDIOGRAM TRACING: CPT

## 2024-06-13 PROCEDURE — 70450 CT HEAD/BRAIN W/O DYE: CPT

## 2024-06-13 PROCEDURE — 36415 COLL VENOUS BLD VENIPUNCTURE: CPT | Performed by: EMERGENCY MEDICINE

## 2024-06-13 RX ORDER — ACETAMINOPHEN 325 MG/1
650 TABLET ORAL ONCE
Status: COMPLETED | OUTPATIENT
Start: 2024-06-13 | End: 2024-06-13

## 2024-06-13 RX ORDER — METOCLOPRAMIDE HYDROCHLORIDE 5 MG/ML
10 INJECTION INTRAMUSCULAR; INTRAVENOUS ONCE
Status: COMPLETED | OUTPATIENT
Start: 2024-06-13 | End: 2024-06-13

## 2024-06-13 RX ORDER — KETOROLAC TROMETHAMINE 30 MG/ML
15 INJECTION, SOLUTION INTRAMUSCULAR; INTRAVENOUS ONCE
Status: COMPLETED | OUTPATIENT
Start: 2024-06-13 | End: 2024-06-13

## 2024-06-13 RX ADMIN — SODIUM CHLORIDE 500 ML: 0.9 INJECTION, SOLUTION INTRAVENOUS at 14:25

## 2024-06-13 RX ADMIN — METOCLOPRAMIDE 10 MG: 5 INJECTION, SOLUTION INTRAMUSCULAR; INTRAVENOUS at 14:26

## 2024-06-13 RX ADMIN — ACETAMINOPHEN 650 MG: 325 TABLET, FILM COATED ORAL at 14:21

## 2024-06-13 RX ADMIN — KETOROLAC TROMETHAMINE 15 MG: 30 INJECTION, SOLUTION INTRAMUSCULAR; INTRAVENOUS at 14:23

## 2024-06-13 NOTE — DISCHARGE INSTRUCTIONS
Please follow up PCP.  Also recommend following with PCP to discuss pretreatment for CT with contrast.  Recommend tylenol 650 mg and ibuprofen 600 mg every 6 hours as needed for pain. Please return for severe chest pain, significant shortness of breath, severely worsening symptoms, or any other concerning signs or symptoms. Please refer to the following documents for additional instructions and return precautions.     Questionable round hypodensity with apparent thin rim of hyperdensity in the region of left lentiform nucleus/external capsule measuring 1.7 x 1.6 cm (2/21), similar to prior exams. This is indeterminate and may represent a focal lesion or artifactual , from microangiopathic changes in beam hardening artifact. Since patient cannot get MRI due to the cochlear implant, further evaluation with contrast-enhanced CT is recommended.

## 2024-06-13 NOTE — ED PROVIDER NOTES
History  Chief Complaint   Patient presents with    Headache     Headache since last night at approx 9pm, hx of stroke, also reports a fall on sat that she denies hitting her head, states that she landed on her right side, asa daily      73-year-old female history of hypertension on aspirin presenting with headache.  Patient reports positional headache beginning last night worse with bending down and improved with standing up.  Still reports persistent headache while sitting upright at rest.  Reports that she fell on Saturday and does not believe she struck her head.  Denies LOC.  Reports chronic neck and upper back pain.  Denies any worsening.  Denies any neurological changes such as motor or sensory deficits.  Denies any visual changes.  Denies any chest pain shortness of breath.  Denies any abdominal pain nausea vomiting diarrhea.  Denies any other complaints.  Chart reviewed.    Past Medical History:  No date: Hypothyroid  Family History: non-contributory  Social History          Prior to Admission Medications   Prescriptions Last Dose Informant Patient Reported? Taking?   Cholecalciferol (Vitamin D High Potency) 25 MCG (1000 UT) capsule  Self Yes No   Cyanocobalamin 1000 MCG/ML KIT  Self Yes No   Sig: Inject into a muscle   FLUoxetine (PROzac) 20 mg capsule  Self Yes No   Sig: Pt reported taking 40 mg   Ferric Derisomaltose (MONOFERRIC IV)  Self Yes No   Sig: Inject into a catheter in a vein   LORazepam (Ativan) 0.5 mg tablet  Self Yes No   amLODIPine (NORVASC) 10 mg tablet   No No   Sig: Take 1 tablet (10 mg total) by mouth daily   aspirin 81 mg chewable tablet  Self No No   Sig: Chew 1 tablet (81 mg total) daily   atorvastatin (LIPITOR) 40 mg tablet  Self No No   Sig: Take 1 tablet (40 mg total) by mouth daily   colesevelam (WELCHOL) 625 mg tablet  Self Yes No   Sig: Take 625 mg by mouth 2 (two) times a day 3 tabs in the AM 3 tabs in the PM   diphenhydrAMINE (BENADRYL) 50 mg capsule  Self No No   Sig: Take 1  tab 60 min prior to scheduled Imaging   gabapentin (NEURONTIN) 400 mg capsule  Self Yes No   ipratropium (ATROVENT) 0.06 % nasal spray  Self Yes No   irbesartan (AVAPRO) 300 mg tablet  Self No No   Sig: Take 1 tablet (300 mg total) by mouth daily at bedtime   levothyroxine 175 mcg tablet  Self Yes No   Sig: Take 175 mcg by mouth daily   methylPREDNISolone (MEDROL) 32 MG tablet  Self No No   Sig: Take 1 tablet (32 mg total) by mouth daily Take one 12 hours prior to CTA and one two hours prior to CTA   Patient not taking: Reported on 4/22/2024   pantoprazole (PROTONIX) 40 mg tablet  Self Yes No   predniSONE 5 mg tablet  Self Yes No   Sig: Take 5 mg by mouth   propranolol (INDERAL) 10 mg tablet  Self No No   Sig: Take 1 tablet (10 mg total) by mouth 3 (three) times a day   traMADol (Ultram) 50 mg tablet  Self Yes No   Sig: Take by mouth      Facility-Administered Medications: None       Past Medical History:   Diagnosis Date    Hypothyroid        Past Surgical History:   Procedure Laterality Date    FL MYELOGRAM CERVICAL  07/16/2015    FL MYELOGRAM CERVICAL  06/17/2013    REPLACEMENT AORTIC VALVE TRANSCATHETER (TAVR) N/A 07/28/2023       Family History   Problem Relation Age of Onset    Heart disease Mother     Heart disease Father     Stroke Father     Heart disease Brother     Heart disease Maternal Grandmother     Heart disease Paternal Grandfather      I have reviewed and agree with the history as documented.    E-Cigarette/Vaping    E-Cigarette Use Never User      E-Cigarette/Vaping Substances    Nicotine No     THC No     CBD No     Flavoring No     Other No     Unknown No      Social History     Tobacco Use    Smoking status: Former     Types: Cigarettes   Vaping Use    Vaping status: Never Used   Substance Use Topics    Alcohol use: Yes     Comment: social    Drug use: Never       Review of Systems   Constitutional:  Negative for appetite change, chills, diaphoresis, fever and unexpected weight change.   HENT:   Negative for congestion and rhinorrhea.    Eyes:  Negative for photophobia and visual disturbance.   Respiratory:  Negative for cough, chest tightness and shortness of breath.    Cardiovascular:  Negative for chest pain, palpitations and leg swelling.   Gastrointestinal:  Negative for abdominal distention, abdominal pain, blood in stool, constipation, diarrhea, nausea and vomiting.   Genitourinary:  Negative for dysuria and hematuria.   Musculoskeletal:  Negative for back pain, joint swelling, neck pain and neck stiffness.   Skin:  Negative for color change, pallor, rash and wound.   Neurological:  Positive for headaches. Negative for dizziness, syncope, weakness and light-headedness.   Psychiatric/Behavioral:  Negative for agitation.    All other systems reviewed and are negative.      Physical Exam  Physical Exam  Vitals and nursing note reviewed.   Constitutional:       General: She is not in acute distress.     Appearance: Normal appearance. She is well-developed. She is not ill-appearing, toxic-appearing or diaphoretic.   HENT:      Head: Normocephalic and atraumatic.      Nose: Nose normal. No congestion or rhinorrhea.      Mouth/Throat:      Mouth: Mucous membranes are moist.      Pharynx: Oropharynx is clear. No oropharyngeal exudate or posterior oropharyngeal erythema.   Eyes:      General: No scleral icterus.        Right eye: No discharge.         Left eye: No discharge.      Extraocular Movements: Extraocular movements intact.      Conjunctiva/sclera: Conjunctivae normal.      Pupils: Pupils are equal, round, and reactive to light.   Neck:      Vascular: No JVD.      Trachea: No tracheal deviation.      Comments: Supple. Normal range of motion.  Bilateral neck paraspinal muscular tenderness and additional paraspinal muscular tenderness upper thoracic back.  No midline tenderness.  Non meningeal  Cardiovascular:      Rate and Rhythm: Normal rate and regular rhythm.      Heart sounds: Normal heart sounds.  No murmur heard.     No friction rub. No gallop.      Comments: Normal rate and regular rhythm  Pulmonary:      Effort: Pulmonary effort is normal. No respiratory distress.      Breath sounds: Normal breath sounds. No stridor. No wheezing or rales.      Comments: Clear to auscultation bilaterally  Chest:      Chest wall: No tenderness.   Abdominal:      General: Bowel sounds are normal. There is no distension.      Palpations: Abdomen is soft.      Tenderness: There is no abdominal tenderness. There is no right CVA tenderness, left CVA tenderness, guarding or rebound.      Comments: Soft, nontender, nondistended.  Normal bowel sounds throughout   Musculoskeletal:         General: No swelling, deformity or signs of injury. Normal range of motion.      Cervical back: Normal range of motion and neck supple. Tenderness present. No rigidity. No muscular tenderness.      Right lower leg: No edema.      Left lower leg: No edema.   Lymphadenopathy:      Cervical: No cervical adenopathy.   Skin:     General: Skin is warm and dry.      Coloration: Skin is not pale.      Findings: No erythema or rash.   Neurological:      General: No focal deficit present.      Mental Status: She is alert. Mental status is at baseline.      Sensory: No sensory deficit.      Motor: No weakness or abnormal muscle tone.      Coordination: Coordination normal.      Gait: Gait normal.      Comments: Alert.  Strength and sensation grossly intact.  Ambulatory without difficulty at baseline.    Psychiatric:         Behavior: Behavior normal.         Thought Content: Thought content normal.         Vital Signs  ED Triage Vitals   Temperature Pulse Respirations Blood Pressure SpO2   06/13/24 1351 06/13/24 1351 06/13/24 1351 06/13/24 1352 06/13/24 1352   97.6 °F (36.4 °C) 78 18 (!) 179/77 99 %      Temp src Heart Rate Source Patient Position - Orthostatic VS BP Location FiO2 (%)   -- 06/13/24 1630 06/13/24 1630 06/13/24 1630 --    Monitor Lying Right arm        Pain Score       06/13/24 1421       8           Vitals:    06/13/24 1351 06/13/24 1352 06/13/24 1630 06/13/24 1700   BP:  (!) 179/77 124/56 125/60   Pulse: 78  57 56   Patient Position - Orthostatic VS:   Lying Lying         Visual Acuity      ED Medications  Medications   sodium chloride 0.9 % bolus 500 mL (500 mL Intravenous New Bag 6/13/24 1425)   acetaminophen (TYLENOL) tablet 650 mg (650 mg Oral Given 6/13/24 1421)   ketorolac (TORADOL) injection 15 mg (15 mg Intravenous Given 6/13/24 1423)   metoclopramide (REGLAN) injection 10 mg (10 mg Intravenous Given 6/13/24 1426)       Diagnostic Studies  Results Reviewed       Procedure Component Value Units Date/Time    Basic metabolic panel [[id number]]  (Abnormal) Collected: 06/13/24 1654    Lab Status: Final result Specimen: Blood from Arm, Left Updated: 06/13/24 1724     Sodium 138 mmol/L      Potassium 4.4 mmol/L      Chloride 109 mmol/L      CO2 24 mmol/L      ANION GAP 5 mmol/L      BUN 23 mg/dL      Creatinine 0.64 mg/dL      Glucose 84 mg/dL      Calcium 10.0 mg/dL      eGFR 87 ml/min/1.73sq m     Narrative:      National Kidney Disease Foundation guidelines for Chronic Kidney Disease (CKD):     Stage 1 with normal or high GFR (GFR > 90 mL/min/1.73 square meters)    Stage 2 Mild CKD (GFR = 60-89 mL/min/1.73 square meters)    Stage 3A Moderate CKD (GFR = 45-59 mL/min/1.73 square meters)    Stage 3B Moderate CKD (GFR = 30-44 mL/min/1.73 square meters)    Stage 4 Severe CKD (GFR = 15-29 mL/min/1.73 square meters)    Stage 5 End Stage CKD (GFR <15 mL/min/1.73 square meters)  Note: GFR calculation is accurate only with a steady state creatinine    HS Troponin 0hr (reflex protocol) [[id number]]  (Normal) Collected: 06/13/24 1417    Lab Status: Final result Specimen: Blood from Arm, Left Updated: 06/13/24 1459     hs TnI 0hr 3 ng/L     CBC and differential [[id number]] Collected: 06/13/24 1417    Lab Status: Final result Specimen: Blood from Arm, Left Updated:  06/13/24 1430     WBC 8.87 Thousand/uL      RBC 4.32 Million/uL      Hemoglobin 13.3 g/dL      Hematocrit 39.9 %      MCV 92 fL      MCH 30.8 pg      MCHC 33.3 g/dL      RDW 13.0 %      MPV 9.7 fL      Platelets 197 Thousands/uL      nRBC 0 /100 WBCs      Segmented % 62 %      Immature Grans % 0 %      Lymphocytes % 23 %      Monocytes % 11 %      Eosinophils Relative 3 %      Basophils Relative 1 %      Absolute Neutrophils 5.57 Thousands/µL      Absolute Immature Grans 0.02 Thousand/uL      Absolute Lymphocytes 2.00 Thousands/µL      Absolute Monocytes 0.99 Thousand/µL      Eosinophils Absolute 0.23 Thousand/µL      Basophils Absolute 0.06 Thousands/µL                    CT head without contrast   Final Result by Abel Russell MD (06/13 1619)      Questionable round hypodensity with apparent thin rim of hyperdensity in the region of left lentiform nucleus/external capsule measuring 1.7 x 1.6 cm (2/21), similar to prior exams. This is indeterminate and may represent a focal lesion or artifactual    from microangiopathic changes in beam hardening artifact. Since patient cannot get MRI due to the cochlear implant, further evaluation with contrast-enhanced CT is recommended.      No intracranial hemorrhage.         I personally discussed this study with Dr. JOLENE MARTIN on 6/13/2024 4:18 PM.                        Workstation performed: [id number]         CT spine cervical without contrast   Final Result by Abel Russell MD (06/13 6039)      No cervical spine fracture or traumatic malalignment.      Prior ACDF of C3-C6. Intact hardware.            Workstation performed: [id number]         CT spine thoracic without contrast   Final Result by Abel Russell MD (06/13 1174)      No acute osseous abnormality. Multilevel degenerative changes.            Workstation performed: [id number]                    Procedures  Procedures         ED Course                               SBIRT 20yo+       Flowsheet Row Most Recent Value   Initial Alcohol Screen: US AUDIT-C     1. How often do you have a drink containing alcohol? 0 Filed at: 06/13/2024 1350   2. How many drinks containing alcohol do you have on a typical day you are drinking?  0 Filed at: 06/13/2024 1350   3a. Male UNDER 65: How often do you have five or more drinks on one occasion? 0 Filed at: 06/13/2024 1350   3b. FEMALE Any Age, or MALE 65+: How often do you have 4 or more drinks on one occassion? 0 Filed at: 06/13/2024 1350   Audit-C Score 0 Filed at: 06/13/2024 1350   ABRAN: How many times in the past year have you...    Used an illegal drug or used a prescription medication for non-medical reasons? Never Filed at: 06/13/2024 1350                      Medical Decision Making  73-year-old female history of hypertension on aspirin presenting with headache.  Positional headache not maximal at onset not the worst headache of her life.  Plan for CT imaging.  Basic labs.  Reassess.    Symptoms significantly improved with medication.  CT imaging notable for possible lesion which has been seen previously and remained stable.  Recommendation for CT with contrast.  Patient reports that she has always received long outpatient prep prior to receiving contrast.  Shared decision-making with patient and son at bedside regarding lesion that has remained stable likely is not contributing, patient and son opting for outpatient follow-up so they can do the long precontrast prep.  Headache precautions. Discussed results and recommendations. Advised follow up PCP. Medication recommendations. Given instructions and return precautions. Patient/family at bedside acknowledged understanding of all written and verbal instructions and return precautions. Discharged.     Amount and/or Complexity of Data Reviewed  Labs: ordered.  Radiology: ordered.    Risk  OTC drugs.  Prescription drug management.             Disposition  Final diagnoses:   Acute headache   Brain lesion      Time reflects when diagnosis was documented in both MDM as applicable and the Disposition within this note       Time User Action Codes Description Comment    6/13/2024  5:28 PM Bobby Mirza [R51.9] Acute headache     6/13/2024  5:28 PM Bobby Mirza [G93.9] Brain lesion           ED Disposition       ED Disposition   Discharge    Condition   Stable    Date/Time   Thu Jun 13, 2024 1728    Comment   Maddie Brenda discharge to home/self care.                   Follow-up Information       Follow up With Specialties Details Why Contact Info    Ed Olivares MD  Schedule an appointment as soon as possible for a visit in 1 week  [address] [place] [address]  [phone]              Patient's Medications   Discharge Prescriptions    No medications on file       No discharge procedures on file.    PDMP Review       None            ED Provider  Electronically Signed by             Bobby Mirza MD  06/13/24 1625

## 2024-06-16 LAB
ATRIAL RATE: 74 BPM
P AXIS: 68 DEGREES
PR INTERVAL: 172 MS
QRS AXIS: 91 DEGREES
QRSD INTERVAL: 72 MS
QT INTERVAL: 374 MS
QTC INTERVAL: 415 MS
T WAVE AXIS: 54 DEGREES
VENTRICULAR RATE: 74 BPM

## 2024-06-16 PROCEDURE — 93010 ELECTROCARDIOGRAM REPORT: CPT | Performed by: INTERNAL MEDICINE

## 2024-06-18 ENCOUNTER — PATIENT MESSAGE (OUTPATIENT)
Dept: CARDIOLOGY CLINIC | Facility: CLINIC | Age: 74
End: 2024-06-18

## 2024-06-19 ENCOUNTER — TELEPHONE (OUTPATIENT)
Dept: CARDIOLOGY CLINIC | Facility: CLINIC | Age: 74
End: 2024-06-19

## 2024-06-19 NOTE — TELEPHONE ENCOUNTER
Maddie Gutierrez   to P [facility]iology [facility] (supporting Fer Babcock MD)         6/18/24  9:29 PM  I was in the [facility] ER on Thursday 6/13/24.   I just received a copy of the EKG report saying it’s abnormal.  If that’s true, do I need to do anything about it?  Thanks for your help.

## 2024-06-21 NOTE — TELEPHONE ENCOUNTER
Patient returned call, unable to hear message on her mobile number.  Relayed message from Dr. Babcock.  Pt verbalized understanding.

## 2024-08-14 ENCOUNTER — TELEPHONE (OUTPATIENT)
Age: 74
End: 2024-08-14

## 2024-08-14 ENCOUNTER — TELEPHONE (OUTPATIENT)
Dept: NEUROLOGY | Facility: CLINIC | Age: 74
End: 2024-08-14

## 2024-08-14 NOTE — TELEPHONE ENCOUNTER
Called and left detailed message regarding upcoming appt, Dr Henderson will not be in office for 6 week so we are going to need to r/s directly with her

## 2024-08-14 NOTE — TELEPHONE ENCOUNTER
Patient called to report  that they are having worsening symptoms    Patient experiencing Dizziness/ Diplopia and HA  off and on for the past few weeks    Patient provider is out of office and patient asking if they can be seen by another provider  since they needed to Rs out in November  for a Follow up.         Please contact the patient to address concerns                     Thank you!

## 2024-08-15 NOTE — TELEPHONE ENCOUNTER
Phone call to patient regarding symptoms mentioned below. Left detailed message on patient's VM with our call back number asking patient to return my call if symptoms persist. Advised if symptoms continue to worsen and patient feels like she needs immediate care, to precede to the nearest Emergency Room.

## 2024-08-16 ENCOUNTER — HOSPITAL ENCOUNTER (OUTPATIENT)
Dept: NON INVASIVE DIAGNOSTICS | Facility: CLINIC | Age: 74
Discharge: HOME | End: 2024-08-16
Payer: MEDICARE

## 2024-08-16 VITALS
SYSTOLIC BLOOD PRESSURE: 125 MMHG | BODY MASS INDEX: 42.72 KG/M2 | DIASTOLIC BLOOD PRESSURE: 60 MMHG | HEART RATE: 60 BPM | WEIGHT: 198 LBS | HEIGHT: 57 IN

## 2024-08-16 DIAGNOSIS — I10 PRIMARY HYPERTENSION: ICD-10-CM

## 2024-08-16 LAB
AORTIC ROOT: 3.2 CM
AORTIC VALVE MEAN VELOCITY: 13.3 M/S
APICAL FOUR CHAMBER EJECTION FRACTION: 70 %
ASCENDING AORTA: 3.1 CM
AV AREA BY CONTINUOUS VTI: 1.9 CM2
AV AREA PEAK VELOCITY: 1.6 CM2
AV LVOT MEAN GRADIENT: 3 MMHG
AV LVOT PEAK GRADIENT: 7 MMHG
AV MEAN GRADIENT: 9 MMHG
AV PEAK GRADIENT: 26 MMHG
AV VALVE AREA: 1.94 CM2
AV VELOCITY RATIO: 0.51
BSA FOR ECHO PROCEDURE: 1.79 M2
DOP CALC AO PEAK VEL: 2.56 M/S
DOP CALC AO VTI: 53.22 CM
DOP CALC LVOT AREA: 3.14 CM2
DOP CALC LVOT CARDIAC INDEX: 3.06 L/MIN/M2
DOP CALC LVOT CARDIAC OUTPUT: 5.47 L/MIN
DOP CALC LVOT DIAMETER: 2 CM
DOP CALC LVOT PEAK VEL VTI: 32.9 CM
DOP CALC LVOT PEAK VEL: 1.3 M/S
DOP CALC LVOT STROKE INDEX: 57.5 ML/M2
DOP CALC LVOT STROKE VOLUME: 103
DOP CALC MV VTI: 57.17 CM
E WAVE DECELERATION TIME: 456 MS
E/A RATIO: 0.85
FRACTIONAL SHORTENING: 22 (ref 28–44)
INTERVENTRICULAR SEPTUM IN DIASTOLE (PARASTERNAL SHORT AXIS VIEW): 1.2 CM
INTERVENTRICULAR SEPTUM: 1.2 CM (ref 0.6–1.1)
LAAS-AP2: 19.7 CM2
LAAS-AP4: 18.9 CM2
LEFT ATRIUM SIZE: 3.9 CM
LEFT ATRIUM VOLUME (MOD BIPLANE): 53 ML
LEFT ATRIUM VOLUME INDEX (MOD BIPLANE): 29.6 ML/M2
LEFT INTERNAL DIMENSION IN SYSTOLE: 2.8 CM (ref 2.1–4)
LEFT VENTRICULAR INTERNAL DIMENSION IN DIASTOLE: 3.6 CM (ref 3.5–6)
LEFT VENTRICULAR POSTERIOR WALL IN END DIASTOLE: 1 CM
LEFT VENTRICULAR STROKE VOLUME: 24 ML
LVSV (TEICH): 24 ML
MV E'TISSUE VEL-SEP: 6 CM/S
MV MEAN GRADIENT: 3 MMHG
MV PEAK A VEL: 1.56 M/S
MV PEAK E VEL: 132 CM/S
MV PEAK GRADIENT: 11 MMHG
MV STENOSIS PRESSURE HALF TIME: 132 MS
MV VALVE AREA BY CONTINUITY EQUATION: 1.81 CM2
MV VALVE AREA P 1/2 METHOD: 1.7
RIGHT ATRIUM AREA SYSTOLE A4C: 16.6 CM2
RIGHT VENTRICLE ID DIMENSION: 3.2 CM
SL CV LEFT ATRIUM LENGTH A2C: 5.5 CM
SL CV PED ECHO LEFT VENTRICLE DIASTOLIC VOLUME (MOD BIPLANE) 2D: 53 ML
SL CV PED ECHO LEFT VENTRICLE SYSTOLIC VOLUME (MOD BIPLANE) 2D: 29 ML
TRICUSPID ANNULAR PLANE SYSTOLIC EXCURSION: 2.1 CM

## 2024-08-16 PROCEDURE — 93306 TTE W/DOPPLER COMPLETE: CPT

## 2024-08-16 PROCEDURE — 93306 TTE W/DOPPLER COMPLETE: CPT | Performed by: STUDENT IN AN ORGANIZED HEALTH CARE EDUCATION/TRAINING PROGRAM

## 2024-08-19 LAB
AORTIC ROOT: 3.2 CM
AORTIC VALVE MEAN VELOCITY: 13.3 M/S
APICAL FOUR CHAMBER EJECTION FRACTION: 70 %
ASCENDING AORTA: 3.1 CM
AV AREA BY CONTINUOUS VTI: 1.9 CM2
AV AREA PEAK VELOCITY: 1.6 CM2
AV LVOT MEAN GRADIENT: 3 MMHG
AV LVOT PEAK GRADIENT: 7 MMHG
AV MEAN GRADIENT: 9 MMHG
AV PEAK GRADIENT: 26 MMHG
AV VALVE AREA: 1.94 CM2
AV VELOCITY RATIO: 0.51
BSA FOR ECHO PROCEDURE: 1.79 M2
DOP CALC AO PEAK VEL: 2.56 M/S
DOP CALC AO VTI: 53.22 CM
DOP CALC LVOT AREA: 3.14 CM2
DOP CALC LVOT CARDIAC INDEX: 3.06 L/MIN/M2
DOP CALC LVOT CARDIAC OUTPUT: 5.47 L/MIN
DOP CALC LVOT DIAMETER: 2 CM
DOP CALC LVOT PEAK VEL VTI: 32.9 CM
DOP CALC LVOT PEAK VEL: 1.3 M/S
DOP CALC LVOT STROKE INDEX: 57.5 ML/M2
DOP CALC LVOT STROKE VOLUME: 103.31
DOP CALC MV VTI: 57.17 CM
E WAVE DECELERATION TIME: 456 MS
E/A RATIO: 0.85
FRACTIONAL SHORTENING: 22 (ref 28–44)
INTERVENTRICULAR SEPTUM IN DIASTOLE (PARASTERNAL SHORT AXIS VIEW): 1.2 CM
INTERVENTRICULAR SEPTUM: 1.2 CM (ref 0.6–1.1)
LAAS-AP2: 19.7 CM2
LAAS-AP4: 18.9 CM2
LEFT ATRIUM SIZE: 3.9 CM
LEFT ATRIUM VOLUME (MOD BIPLANE): 53 ML
LEFT ATRIUM VOLUME INDEX (MOD BIPLANE): 29.6 ML/M2
LEFT INTERNAL DIMENSION IN SYSTOLE: 2.8 CM (ref 2.1–4)
LEFT VENTRICULAR INTERNAL DIMENSION IN DIASTOLE: 3.6 CM (ref 3.5–6)
LEFT VENTRICULAR POSTERIOR WALL IN END DIASTOLE: 1 CM
LEFT VENTRICULAR STROKE VOLUME: 24 ML
LVSV (TEICH): 24 ML
MV E'TISSUE VEL-SEP: 6 CM/S
MV MEAN GRADIENT: 3 MMHG
MV PEAK A VEL: 1.56 M/S
MV PEAK E VEL: 132 CM/S
MV PEAK GRADIENT: 11 MMHG
MV STENOSIS PRESSURE HALF TIME: 132 MS
MV VALVE AREA BY CONTINUITY EQUATION: 1.81 CM2
MV VALVE AREA P 1/2 METHOD: 1.67
RIGHT ATRIUM AREA SYSTOLE A4C: 16.6 CM2
RIGHT VENTRICLE ID DIMENSION: 3.2 CM
SL CV LEFT ATRIUM LENGTH A2C: 5.5 CM
SL CV LV EF: 70
SL CV PED ECHO LEFT VENTRICLE DIASTOLIC VOLUME (MOD BIPLANE) 2D: 53 ML
SL CV PED ECHO LEFT VENTRICLE SYSTOLIC VOLUME (MOD BIPLANE) 2D: 29 ML
TRICUSPID ANNULAR PLANE SYSTOLIC EXCURSION: 2.1 CM

## 2024-12-23 ENCOUNTER — TELEPHONE (OUTPATIENT)
Dept: NEUROLOGY | Facility: CLINIC | Age: 74
End: 2024-12-23

## 2024-12-23 NOTE — TELEPHONE ENCOUNTER
Called and left VM - offered pt 1/3/25 @1:30pm w/ Dr Henderson. Provided call back number if pt is interested and slot is still available

## 2025-01-02 ENCOUNTER — TELEPHONE (OUTPATIENT)
Dept: NEUROLOGY | Facility: CLINIC | Age: 75
End: 2025-01-02

## 2025-01-02 NOTE — TELEPHONE ENCOUNTER
Left a VM offering appointment slot 1/7 at 4pm and 1/8 at 3:30pm. Left call back number but also said these appointment can be taken and to call back if serious about moving appointment up from March.

## 2025-03-17 PROBLEM — K86.2 PANCREAS CYST: Status: ACTIVE | Noted: 2025-03-17

## 2025-03-24 ENCOUNTER — CONSULT (OUTPATIENT)
Dept: SURGICAL ONCOLOGY | Facility: CLINIC | Age: 75
End: 2025-03-24
Payer: MEDICARE

## 2025-03-24 ENCOUNTER — APPOINTMENT (OUTPATIENT)
Dept: LAB | Facility: CLINIC | Age: 75
End: 2025-03-24
Payer: MEDICARE

## 2025-03-24 VITALS
DIASTOLIC BLOOD PRESSURE: 74 MMHG | HEART RATE: 69 BPM | SYSTOLIC BLOOD PRESSURE: 140 MMHG | HEIGHT: 57 IN | WEIGHT: 207 LBS | RESPIRATION RATE: 20 BRPM | OXYGEN SATURATION: 97 % | TEMPERATURE: 98 F | BODY MASS INDEX: 44.66 KG/M2

## 2025-03-24 DIAGNOSIS — K86.2 PANCREAS CYST: Primary | ICD-10-CM

## 2025-03-24 DIAGNOSIS — K86.2 PANCREAS CYST: ICD-10-CM

## 2025-03-24 DIAGNOSIS — E83.52 HYPERCALCEMIA: ICD-10-CM

## 2025-03-24 DIAGNOSIS — C43.9 MALIGNANT MELANOMA OF SKIN (HCC): ICD-10-CM

## 2025-03-24 LAB
ANION GAP SERPL CALCULATED.3IONS-SCNC: 6 MMOL/L (ref 4–13)
BUN SERPL-MCNC: 15 MG/DL (ref 5–25)
CALCIUM SERPL-MCNC: 10.7 MG/DL (ref 8.4–10.2)
CHLORIDE SERPL-SCNC: 106 MMOL/L (ref 96–108)
CO2 SERPL-SCNC: 27 MMOL/L (ref 21–32)
CREAT SERPL-MCNC: 0.64 MG/DL (ref 0.6–1.3)
GFR SERPL CREATININE-BSD FRML MDRD: 86 ML/MIN/1.73SQ M
GLUCOSE P FAST SERPL-MCNC: 105 MG/DL (ref 65–99)
POTASSIUM SERPL-SCNC: 4.8 MMOL/L (ref 3.5–5.3)
PTH-INTACT SERPL-MCNC: 90.7 PG/ML (ref 12–88)
SODIUM SERPL-SCNC: 139 MMOL/L (ref 135–147)

## 2025-03-24 PROCEDURE — 99204 OFFICE O/P NEW MOD 45 MIN: CPT | Performed by: SURGERY

## 2025-03-24 PROCEDURE — 83970 ASSAY OF PARATHORMONE: CPT

## 2025-03-24 PROCEDURE — 36415 COLL VENOUS BLD VENIPUNCTURE: CPT

## 2025-03-24 PROCEDURE — 80048 BASIC METABOLIC PNL TOTAL CA: CPT

## 2025-03-24 PROCEDURE — 82652 VIT D 1 25-DIHYDROXY: CPT

## 2025-03-24 RX ORDER — PREDNISONE 50 MG/1
TABLET ORAL
Qty: 3 TABLET | Refills: 0 | Status: SHIPPED | OUTPATIENT
Start: 2025-03-24

## 2025-03-24 NOTE — PROGRESS NOTES
Surgical Oncology Consult       [facility] [place]  [address] [facility] [address]  [phone]    Maddie Gutierrez  1950  [id number]  [facility] [place]  [address] [facility] [address]  [phone]    Chief Complaint   Patient presents with    Consult       Assessment/Plan:    No problem-specific Assessment & Plan notes found for this encounter.       Diagnoses and all orders for this visit:    Pancreas cyst      Advance Care Planning/Advance Directives:  Discussed disease status, cancer treatment plans and/or cancer treatment goals with the patient.     Oncology History    No history exists.       History of Present Illness: Patient is a 74-year-old woman who was being worked up for right sided upper quadrant and back pain.  She underwent imaging studies which revealed a pancreatic cyst in the uncinate process as well as in the body/tail.  She is referred for evaluation and treatment.  She has had no history of new onset diabetes.  No history of jaundice.  No history of pancreatitis.  No family history of pancreas malignancies.  Her father tested positive for BRCA2 mutation, though she was negative.    Review of Systems   Constitutional: Negative.    HENT: Negative.     Eyes: Negative.    Respiratory: Negative.     Cardiovascular: Negative.    Gastrointestinal: Negative.    Endocrine: Negative.    Genitourinary: Negative.    Musculoskeletal: Negative.    Skin: Negative.    Allergic/Immunologic: Negative.    Neurological: Negative.    Hematological: Negative.    Psychiatric/Behavioral: Negative.     All other systems reviewed and are negative.        Patient Active Problem List   Diagnosis    Bilateral sensorineural hearing loss    Common variable immunodeficiency (HCC)    Current chronic use of systemic steroids    Malignant melanoma of skin (HCC)     Mixed dyslipidemia    Fibromyalgia    Neurologic gait dysfunction    Neuropathy    Primary hypertension    Rheumatoid arthritis (HCC)    History of stroke    Essential tremor    Vertigo    Pancreas cyst     Past Medical History:   Diagnosis Date    Hypothyroid      Past Surgical History:   Procedure Laterality Date    FL MYELOGRAM CERVICAL  07/16/2015    FL MYELOGRAM CERVICAL  06/17/2013    REPLACEMENT AORTIC VALVE TRANSCATHETER (TAVR) N/A 07/28/2023     Family History   Problem Relation Age of Onset    Heart disease Mother     Heart disease Father     Stroke Father     Heart disease Brother     Heart disease Maternal Grandmother     Heart disease Paternal Grandfather      Social History     Socioeconomic History    Marital status:      Spouse name: Not on file    Number of children: Not on file    Years of education: Not on file    Highest education level: Not on file   Occupational History    Not on file   Tobacco Use    Smoking status: Former     Types: Cigarettes    Smokeless tobacco: Not on file   Vaping Use    Vaping status: Never Used   Substance and Sexual Activity    Alcohol use: Yes     Comment: social    Drug use: Never    Sexual activity: Not on file   Other Topics Concern    Not on file   Social History Narrative    Not on file     Social Drivers of Health     Financial Resource Strain: Not on file   Food Insecurity: Not on file   Transportation Needs: Not on file   Physical Activity: Not on file   Stress: Not on file   Social Connections: Unknown (6/18/2024)    Received from [employer]     How often do you feel lonely or isolated from those around you? (Adult - for ages 18 years and over): Not on file   Intimate Partner Violence: Not At Risk (11/13/2023)    [facility]    Interpersonal Violence     Interpersonal Violence: No   Housing Stability: Low Risk  (4/30/2024)    Received from [facility]    Housing Stability      Unstable Housing in the Last Year: Not on file       Current Outpatient Medications:     amLODIPine (NORVASC) 10 mg tablet, Take 1 tablet (10 mg total) by mouth daily, Disp: 60 tablet, Rfl: 12    aspirin 81 mg chewable tablet, Chew 1 tablet (81 mg total) daily, Disp: 30 tablet, Rfl: 0    atorvastatin (LIPITOR) 40 mg tablet, Take 1 tablet (40 mg total) by mouth daily, Disp: 90 tablet, Rfl: 3    Cholecalciferol (Vitamin D High Potency) 25 MCG (1000 UT) capsule, , Disp: , Rfl:     colesevelam (WELCHOL) 625 mg tablet, Take 625 mg by mouth 2 (two) times a day 3 tabs in the AM 3 tabs in the PM, Disp: , Rfl:     Cyanocobalamin 1000 MCG/ML KIT, Inject into a muscle, Disp: , Rfl:     diphenhydrAMINE (BENADRYL) 50 mg capsule, Take 1 tab 60 min prior to scheduled Imaging, Disp: 1 capsule, Rfl: 0    Ferric Derisomaltose (MONOFERRIC IV), Inject into a catheter in a vein, Disp: , Rfl:     FLUoxetine (PROzac) 20 mg capsule, Pt reported taking 40 mg, Disp: , Rfl:     gabapentin (NEURONTIN) 400 mg capsule, , Disp: , Rfl:     ipratropium (ATROVENT) 0.06 % nasal spray, , Disp: , Rfl:     irbesartan (AVAPRO) 300 mg tablet, Take 1 tablet (300 mg total) by mouth daily at bedtime, Disp: 30 tablet, Rfl: 12    levothyroxine 175 mcg tablet, Take 175 mcg by mouth daily, Disp: , Rfl:     LORazepam (Ativan) 0.5 mg tablet, , Disp: , Rfl:     pantoprazole (PROTONIX) 40 mg tablet, , Disp: , Rfl:     predniSONE 5 mg tablet, Take 5 mg by mouth, Disp: , Rfl:     propranolol (INDERAL) 10 mg tablet, Take 1 tablet (10 mg total) by mouth 3 (three) times a day, Disp: 90 tablet, Rfl: 3    traMADol (Ultram) 50 mg tablet, Take by mouth, Disp: , Rfl:     methylPREDNISolone (MEDROL) 32 MG tablet, Take 1 tablet (32 mg total) by mouth daily Take one 12 hours prior to CTA and one two hours prior to CTA (Patient not taking: Reported on 4/22/2024), Disp: 2 tablet, Rfl: 0  Allergies   Allergen Reactions    Alcohol Gel Base - Food Allergy Swelling    Infliximab  Shortness Of Breath    Iodine - Food Allergy Hives and Itching     Any kind of Iodine  Topical iodine      Tobramycin Swelling     Eye drops  Eye drops  Eyes turn bright red  Eye drops  Eyes turn bright red  Eye drops      Other Hives    Shellfish Allergy - Food Allergy Hives     Nausea and abdominal pain    Sulfa Antibiotics Hives    Sulfamethoxazole-Trimethoprim Hives     Vitals:    03/24/25 1254   BP: 140/74   Pulse: 69   Resp: 20   Temp: 98 °F (36.7 °C)   SpO2: 97%       Physical Exam  Vitals reviewed.   Constitutional:       Appearance: Normal appearance.   HENT:      Head: Normocephalic and atraumatic.      Right Ear: External ear normal.      Left Ear: External ear normal.   Eyes:      Extraocular Movements: Extraocular movements intact.      Pupils: Pupils are equal, round, and reactive to light.   Cardiovascular:      Rate and Rhythm: Normal rate and regular rhythm.      Heart sounds: Normal heart sounds.   Pulmonary:      Effort: Pulmonary effort is normal.      Breath sounds: Normal breath sounds.   Abdominal:      General: Abdomen is flat. There is no distension.      Palpations: Abdomen is soft. There is no mass.      Tenderness: There is no abdominal tenderness. There is no guarding or rebound.      Hernia: No hernia is present.   Musculoskeletal:         General: Normal range of motion.      Cervical back: Normal range of motion.   Skin:     General: Skin is warm and dry.   Neurological:      General: No focal deficit present.      Mental Status: She is alert and oriented to person, place, and time.   Psychiatric:         Mood and Affect: Mood normal.         Behavior: Behavior normal.         Pathology:  none    Labs:  Lab Results   Component Value Date    SODIUM 141 12/30/2024    K 5.2 (H) 12/30/2024     12/30/2024    CO2 26 12/30/2024    AGAP 10 12/30/2024    BUN 20 12/30/2024    CREATININE 0.7 12/30/2024    GLUC 122 (H) 12/30/2024    CALCIUM 10.8 (H) 12/30/2024    AST 30 12/30/2024    ALT 29  12/30/2024    ALKPHOS 88 12/30/2024    TP 5.9 (L) 12/30/2024    TBILI 0.4 12/30/2024    EGFR 87 12/30/2024         Imaging  CTA abdomen pelvis w wo contrast  Result Date: 3/3/2025  Narrative: EXAM CT ABD/PELVIS W IV CONTRAST - WO ORAL CONTRAST2/28/2025 1:47 pm HISTORY cyst, hereditary hypogammaglobulinemia, upper abd pain TECHNIQUE Oral contrast: Oral contrast was not administered. Intravenous contrast: Intravenous contrast was administered. Images of the abdomen were acquired in the arterial phase and images of the abdomen and pelvis were acquired in the portal venous phase. COMPARISON Abdominal ultrasound dated 02/14/2025 and CT of the abdomen and pelvis dated 04/27/2017 FINDINGS Limited study due to artifact from fixation hardware in the lumbar spine. LOWER CHEST: Mild bibasilar atelectasis.  Trace pericardial effusion. ABDOMEN: LIVER: Ovoid fat density lesion measuring 1.1 cm peripherally at the dome of the liver containing a septation, which likely represents either a lipoma or a pseudolipoma of the Dane capsule. BILE DUCTS: Within normal limits status post cholecystectomy. GALLBLADDER: Absent. PANCREAS: Cystic lesions in the pancreas measuring 1.4 x 0.7 x 0.8 cm in the pancreatic head and 1.2 x 0.6 x 0.6 cm in the pancreatic body (series 22, images 74 and 88).  These are not visualized on the study from 2017.  No wall thickening or solid component.  No pancreatic duct dilatation. SPLEEN: Mild splenomegaly, with the spleen measuring 12.3 cm in length in the coronal plane. ADRENALS: Within normal limits. KIDNEYS/URETERS: Multiple hypodense lesions in both kidneys measuring up to 4.3 cm on the left in the lower pole and 1.5 cm on the right in the interpolar region are partially obscured by artifact, but likely represent cysts.  No hydroureteronephrosis. PELVIS: BLADDER: Decompressed urinary bladder, limiting evaluation for wall thickening.  No perivesicular fat stranding or fluid.  No bladder calculus.  REPRODUCTIVE ORGANS: Within normal limits. BOWEL: Decompressed stomach.  Normal caliber small and large bowel. No wall thickening. Appendix not visualized.  No pericecal inflammation. LYMPH NODES: No enlarged lymph nodes. VESSELS: Mild atherosclerotic calcification.  No aortic aneurysm.  Patent portal, splenic, and superior mesenteric veins. PERITONEUM: No ascites, free air, or fluid collection. RETROPERITONEUM: Within normal limits. ABDOMINAL WALL: Focal skin thickening and subcutaneous fat stranding in the anterior abdominal wall has worsened since 2017.  No fluid collection.  Small foci of gas in the anterior abdominal wall of the right lower quadrant are likely related to subcutaneous medication injection.  Stable mild heterotopic ossification in the both gluteus tung muscles adjacent to the iliac bones. BONES: Chronic deformities of both iliac bones likely represent either postoperative changes or chronic fractures.  Postoperative changes of laminectomies at L3 and L4 with posterior fixation hardware from L2-L5 and with anterior fixation hardware at L4 and L5 with screws extending into S1.  Unremarkable appearance of the hardware.  No malalignment.  Severe degenerative changes of the lower thoracic and upper lumbar spine.  Small bone islands in the sacrum and proximal left femur.  No suspicious lesion.    Impression: IMPRESSION Limited study due to artifact from lumbar fixation hardware. 1. Skin thickening and subcutaneous fat stranding in the anterior abdominal wall, which has worsened since 2017 and likely represents scarring, but could also represent edema or possibly cellulitis if there is evidence for infection. There is no fluid collection. 2. Cystic lesions in the pancreas measuring 1.4 cm in the pancreatic head and 1.2 cm in the pancreatic body, which likely represent side branch intraductal papillary mucinous neoplasms. A follow-up MRI/MRCP or pancreatic protocol CT (MRI preferred) with and without  contrast is recommended in 2 years. 3. Septated fat density lesion measuring 1.1 cm at the dome of the liver, which likely represents either a lipoma or a pseudolipoma of the Adne capsule. 4. Mild splenomegaly.    CT abdomen pelvis w wo contrast  Result Date: 2/28/2025  Narrative: [id number]    I reviewed the above laboratory and imaging data.    Discussion/Summary: 74-year-old woman, benign appearing pancreatic cysts.  Follow-up in 1 year for scan of pancreas protocol.  She also has elevated calcium levels and a history of high parathyroid levels.  Will order now to get updated set given last PTH level was obtained 5 years ago and her most recent calcium level is elevated.

## 2025-03-25 DIAGNOSIS — E83.52 HYPERCALCEMIA: Primary | ICD-10-CM

## 2025-03-25 LAB — 1,25(OH)2D SERPL-MCNC: 88.1 PG/ML (ref 5–200)

## 2025-03-27 ENCOUNTER — TELEPHONE (OUTPATIENT)
Dept: SURGICAL ONCOLOGY | Facility: CLINIC | Age: 75
End: 2025-03-27

## 2025-03-27 NOTE — TELEPHONE ENCOUNTER
JASSON for patient making her aware that Dr Pena ordered a CT parathyroid scan to be done. Central scheduling and hopeline numbers were given. Also asked if she had the prednisone and that I could order more for her for the CT para when needed.

## 2025-03-28 ENCOUNTER — OFFICE VISIT (OUTPATIENT)
Dept: NEUROLOGY | Facility: CLINIC | Age: 75
End: 2025-03-28
Payer: MEDICARE

## 2025-03-28 VITALS
TEMPERATURE: 97.6 F | SYSTOLIC BLOOD PRESSURE: 146 MMHG | BODY MASS INDEX: 44.44 KG/M2 | OXYGEN SATURATION: 95 % | HEIGHT: 57 IN | WEIGHT: 206 LBS | DIASTOLIC BLOOD PRESSURE: 68 MMHG | HEART RATE: 85 BPM

## 2025-03-28 DIAGNOSIS — G25.0 ESSENTIAL TREMOR: ICD-10-CM

## 2025-03-28 DIAGNOSIS — R42 VERTIGO: Primary | ICD-10-CM

## 2025-03-28 PROCEDURE — G2211 COMPLEX E/M VISIT ADD ON: HCPCS | Performed by: PSYCHIATRY & NEUROLOGY

## 2025-03-28 PROCEDURE — 99213 OFFICE O/P EST LOW 20 MIN: CPT | Performed by: PSYCHIATRY & NEUROLOGY

## 2025-03-28 RX ORDER — PRIMIDONE 50 MG/1
TABLET ORAL
COMMUNITY
Start: 2025-01-07

## 2025-03-28 RX ORDER — PROPRANOLOL HCL 20 MG
20 TABLET ORAL 2 TIMES DAILY
Qty: 60 TABLET | Refills: 6 | Status: SHIPPED | OUTPATIENT
Start: 2025-03-28

## 2025-03-28 NOTE — ASSESSMENT & PLAN NOTE
Patient today reports that her tremors are worse. She reports that her head is shaking more and so are her hands. She denies having any difficulty feeding herself. Will increase propranolol to 20 mg bid. Follow up in 3 months.   Orders:    propranolol (INDERAL) 20 mg tablet; Take 1 tablet (20 mg total) by mouth 2 (two) times a day

## 2025-03-28 NOTE — PROGRESS NOTES
Name: Maddie Gutierrez      : 1950      MRN: [id number]  Encounter Provider: Sumaya Henderson MD  Encounter Date: 3/28/2025   Encounter department: [facility] [place]  :  Assessment & Plan  Essential tremor  Patient today reports that her tremors are worse. She reports that her head is shaking more and so are her hands. She denies having any difficulty feeding herself. Will increase propranolol to 20 mg bid. Follow up in 3 months.   Orders:    propranolol (INDERAL) 20 mg tablet; Take 1 tablet (20 mg total) by mouth 2 (two) times a day    Vertigo  Patient with history of vertigo. CTA without any vascular insufficiency. Propranolol helped with vertigo. Potentially from elevated BP. Will increase propranolol to 20 mg bid.              History of Present Illness   HPI   Patient is a right handed 73-year-old female with history of bilateral sensorineural hearing loss s/p left cochlear implant, common variable immunodeficiency, fibromyalgia, CVA, malignant melanoma, dyslipidemia, neurologic gait dysfunction, neuropathy, primary hypertension and rheumatoid arthritis who presents for follow-up.     Initial visit (2024):  Today she presents with multiple concerns including dizziness, headaches and tremor.     In summary, she has been having a head spinning sensation for many years that waxes and wanes.  They can be triggered by standing up from a chair too fast and turning her head/body too quickly.  Dizziness is associated with nausea and appetite loss.  Symptoms can happen all together or separately.  She does respond well to meclizine and Zofran.  She has been working with PT in the past and Eligio-Hallpike was negative.  She does report having neck pain and numbness and tingling of the hands and feet.  Patient has also been on primidone for her history of essential tremor.  On physical exam today patient with a clear head-bobbing tremor and action tremor.Patient  also reports  headaches that started last summer.  These are a daily bilateral temporal ache that radiates into the jaw.  She believes that stress is a trigger.  She reports clenching her teeth at night.  She has a mouthguard however her new dentist told her not to use it.  She has been taking Tylenol and tramadol at bedtime.  Denies any other migrainous features.     Patient reports that both headaches and dizziness are worse with higher blood pressure.  This is managed by cardiologist whom she'll see in March.      Impression: At this time it seems like patient's headaches and dizziness are both pressure dependent.  I would like to take a closer look at her blood vessels to assess for structural deficits.  Especially given her history of multiple vascular risk factors.  Primidone can also have a side effect of dizziness and based on exam today, does not seem to be helping her tremor. As she reports being symptomatic with high BP, may benefit from better BP control.     I stopped primidone, started propranolol. I ordered a CTA head and neck to evaluate for vascular insufficiency. I continued aspirin and statin.     Workup:    CTA: No significant vertebral artery stenosis. No significant carotid stenosis. No focal intracranial stenosis or aneurysm.    VAS     RIGHT:  There is no evidence of disease throughout the extracranial carotid arteries.  Vertebral artery flow is antegrade.  There is no significant subclavian artery disease.     LEFT:  There is no evidence of disease throughout the extracranial carotid arteries.  Vertebral artery flow is antegrade.  There is no significant subclavian artery disease.        CT C-spine 9/8/2022: there is anterior cervical fusion and discectomy with a metal plate and screws spanning C3-C6.  Alignment is straight.  The C2-3 disc is well preserved.  The C6-7 disc is severely narrowed with vacuum degeneration.      LDL in October 2023  61     Interval history:     Tremors are worse  Head is  shaking more and her hands   Propranolol helped the vertigo   Does not have any difficulty feeding herself   She is unsteady on her feet. Has neuropathy and arthritis   Has a spine surgeon at [facility], Needs to have C7       Review of Systems   Constitutional:  Negative for appetite change, fatigue and fever.   HENT: Negative.  Negative for hearing loss, tinnitus, trouble swallowing and voice change.    Eyes: Negative.  Negative for photophobia, pain and visual disturbance.   Respiratory: Negative.  Negative for shortness of breath.    Cardiovascular: Negative.  Negative for palpitations.   Gastrointestinal: Negative.  Negative for nausea and vomiting.   Endocrine: Negative.  Negative for cold intolerance.   Genitourinary: Negative.  Negative for dysuria, frequency and urgency.   Musculoskeletal:  Negative for back pain, gait problem, myalgias, neck pain and neck stiffness.   Skin: Negative.  Negative for rash.   Allergic/Immunologic: Negative.    Neurological:  Negative for dizziness, tremors, seizures, syncope, facial asymmetry, speech difficulty, weakness, light-headedness, numbness and headaches.        Pt states her tremors has been worse [frequent/stronger] since the last visit and her vertigo symptoms have been much better.   Hematological: Negative.  Does not bruise/bleed easily.   Psychiatric/Behavioral: Negative.  Negative for confusion, hallucinations and sleep disturbance.    All other systems reviewed and are negative.   I have personally reviewed the MA's review of systems and made changes as necessary.         Objective   There were no vitals taken for this visit.    Physical Exam  Neurological Exam

## 2025-04-28 ENCOUNTER — HOSPITAL ENCOUNTER (OUTPATIENT)
Dept: CT IMAGING | Facility: HOSPITAL | Age: 75
Discharge: HOME | End: 2025-04-28
Attending: SURGERY
Payer: MEDICARE

## 2025-04-28 DIAGNOSIS — E83.52 HYPERCALCEMIA: ICD-10-CM

## 2025-04-28 PROCEDURE — 70492 CT SFT TSUE NCK W/O & W/DYE: CPT

## 2025-04-28 RX ADMIN — IOHEXOL 85 ML: 350 INJECTION, SOLUTION INTRAVENOUS at 14:04

## 2025-05-01 ENCOUNTER — TELEPHONE (OUTPATIENT)
Dept: SURGICAL ONCOLOGY | Facility: CLINIC | Age: 75
End: 2025-05-01

## 2025-05-01 NOTE — TELEPHONE ENCOUNTER
LM making patient aware that Dr Pena would like to see her in the office to discuss results of CT parathyroid. Hopeline number given.

## 2025-05-02 NOTE — TELEPHONE ENCOUNTER
PROVIDER: Dr Pena    Pt calling back in regards to message left to schedule an appt to discuss her CT parathyroid results.    Appt was made for 5/14.    Pt was informed that Dr Pena will go over those results with her in detail at that time and advise her of his plan,she understood.  All her questions were answered to her satisfaction and she was appreciative of the help.

## 2025-05-09 PROBLEM — E21.3 HYPERPARATHYROIDISM (HCC): Status: ACTIVE | Noted: 2025-05-09

## 2025-05-09 PROBLEM — E83.52 HYPERCALCEMIA: Status: ACTIVE | Noted: 2025-05-09

## 2025-05-14 ENCOUNTER — OFFICE VISIT (OUTPATIENT)
Dept: SURGICAL ONCOLOGY | Facility: CLINIC | Age: 75
End: 2025-05-14
Payer: MEDICARE

## 2025-05-14 VITALS
HEART RATE: 71 BPM | DIASTOLIC BLOOD PRESSURE: 76 MMHG | WEIGHT: 210 LBS | BODY MASS INDEX: 45.3 KG/M2 | RESPIRATION RATE: 18 BRPM | OXYGEN SATURATION: 98 % | SYSTOLIC BLOOD PRESSURE: 142 MMHG | TEMPERATURE: 98.4 F | HEIGHT: 57 IN

## 2025-05-14 DIAGNOSIS — E83.52 HYPERCALCEMIA: ICD-10-CM

## 2025-05-14 DIAGNOSIS — E21.3 HYPERPARATHYROIDISM (HCC): Primary | ICD-10-CM

## 2025-05-14 PROCEDURE — 99213 OFFICE O/P EST LOW 20 MIN: CPT | Performed by: SURGERY

## 2025-05-14 NOTE — ASSESSMENT & PLAN NOTE
Suspected primary hyperparathyroidism, with target noted on left side on CAT scan.  She is amenable to and a candidate for parathyroidectomy.  Will give her a date for surgery and see her 1 month prior to planned operation.

## 2025-05-14 NOTE — PROGRESS NOTES
Name: Maddie Gutierrez      : 1950      MRN: [id number]  Encounter Provider: Moe Pena MD  Encounter Date: 2025   Encounter department: [facility] [place]  :  Assessment & Plan  Hyperparathyroidism (HCC)         Hypercalcemia  Suspected primary hyperparathyroidism, with target noted on left side on CAT scan.  She is amenable to and a candidate for parathyroidectomy.  Will give her a date for surgery and see her 1 month prior to planned operation.           History of Present Illness   Chief Complaint   Patient presents with    Office Visit     Patient is a 74-year-old woman who we follow for pancreatic cysts.  As part of her workup, she is found have elevated calcium levels as well as PTH levels that have been abnormal for some time.  We repeat her blood work given that the studies were several years old.  We also ordered imaging studies as part of her workup.  She is now here to discuss results and make treatment plans.    Review of Systems   Constitutional: Negative.    HENT: Negative.     Eyes: Negative.    Respiratory: Negative.     Cardiovascular: Negative.    Gastrointestinal: Negative.    Endocrine: Negative.    Genitourinary: Negative.    Musculoskeletal: Negative.    Skin: Negative.    Allergic/Immunologic: Negative.    Neurological: Negative.    Hematological: Negative.    Psychiatric/Behavioral: Negative.     All other systems reviewed and are negative.   A complete review of systems is negative other than that noted above in the HPI.    CT  NECK  WITHOUT AND WITH CONTRAST FROM SAMAN TO SKULL BASE     INDICATION: Hyperparathyroidism. Mildly elevated calcium and parathyroid levels.     COMPARISON:  None.     TECHNIQUE:This examination, like all CT scans performed in the [facility], was performed utilizing techniques to minimize radiation dose exposure, including the use of iterative reconstruction and automated exposure  control.     Both noncontrast, contrast, and post contrast delayed images were performed according to standard parathyroid protocol (4D technique) Coronal and sagittal reconstructions were performed. 3D reconstructions were performed on an independent workstation,   and are supplied for review.     85 mL of iohexol was injected intravenously without immediate consequence.     Radiation dose: 1665 mGy-cm.     FINDINGS:     SERIAL NONCONTRAST, POST CONTRAST AND DELAYS: Streak artifact from orthopedic hardware limits evaluation.     There is a mildly triangular-shaped nodular lesion within the left paraesophageal region on series 4, image 170. This demonstrates noncontrast Hounsfield units of 84, arterial phase enhancement with Hounsfield units of 160 and washout with delayed phase   Hounsfield units of 96. This measures 7 x 5 mm. This is located at the level of C6-C7 approximately 1.8 cm below the level of the inferior margin of the cricoid.     There is a pretracheal nodule measuring 3 x 8 mm on series 4, image 350. This demonstrates noncontrast Hounsfield units of 45, mild enhancement on arterial phase imaging with Hounsfield units of 57 and no washout on delayed phase imaging. Findings   therefore likely represent a lymph lymph node.     VASCULAR STRUCTURES:  This study was not performed for the evaluation of the carotid arteries, and therefore Nascet criteria do not apply. There are atherosclerotic changes noted. There is no evidence for an aberrant right subclavian artery.     VISUALIZED PARANASAL SINUSES:  Normal.     NASAL CAVITY AND NASOPHARYNX:  Normal.     SUPRAHYOID NECK:  Normal oropharynx, oral cavity, parapharyngeal and retropharyngeal spaces.     INFRAHYOID NECK:  Normal oropharynx, oral cavity, parapharyngeal and retropharyngeal spaces.     THYROID GLAND:  Normal.     LYMPH NODES:  No pathologic or enlarged adenopathy.     MEDIASTINUM:  Unremarkable.     BONY STRUCTURES  Status post anterior cervical  discectomy and spinal fusion from C3-C6. Orthopedic hardware is intact.     LUNG APICES:  Unremarkable.     IMPRESSION:  Limited study secondary to streak artifact from cervical spinal fusion hardware. Candidate lesion for parathyroid adenoma within the left paraesophageal region at the level of C6-C7, as described above.     No evidence for an aberrant right subclavian artery.                                   Workstation performed: [id number]        Current Medications[1]   Objective   There were no vitals taken for this visit.    Pain Screening:     ECOG    Physical Exam:        Constitutional: General appearance: The Patient is well-developed and well-nourished who appears the stated age in no acute distress. Patient is pleasant and talkative.  HEENT: Normocephalic. Sclerae are anicteric. Mucous membranes are moist. Neck is supple without adenopathy. No JVD.  Chest: The lungs are clear to auscultation.  Cardiac: Heart is regular rate.  Abdomen: Abdomen is soft, non-tender, non-distended and without masses.  Extremities: There is no clubbing or cyanosis. There is no edema. Symmetric.  Neuro: Grossly nonfocal. Gait is normal.  Lymphatic: No evidence of cervical adenopathy bilaterally.  No evidence of axillary adenopathy bilaterally. No evidence of inguinal adenopathy bilaterally.  Skin: Warm, anicteric.  Psych: Patient is pleasant and talkative    Labs: I have reviewed pertinent labs.:    Lab Results   Component Value Date    PTH 90.7 (H) 03/24/2025    CALCIUM 10.7 (H) 03/24/2025         Radiology Results Review : I have reviewed images/report studies in PACS as described above (in the HPI).  Other Study Results Review : My interpretation of other studies include: parathyroid ct.           [1]   Current Outpatient Medications   Medication Sig Dispense Refill    amLODIPine (NORVASC) 10 mg tablet Take 1 tablet (10 mg total) by mouth daily 60 tablet 12    aspirin 81 mg chewable tablet Chew 1 tablet (81 mg total)  daily 30 tablet 0    atorvastatin (LIPITOR) 40 mg tablet Take 1 tablet (40 mg total) by mouth daily 90 tablet 3    Cholecalciferol (Vitamin D High Potency) 25 MCG (1000 UT) capsule       colesevelam (WELCHOL) 625 mg tablet Take 625 mg by mouth in the morning and 625 mg in the evening. 3 tabs in the AM 3 tabs in the PM.      Cyanocobalamin 1000 MCG/ML KIT Inject into a muscle      Ferric Derisomaltose (MONOFERRIC IV) Inject into a catheter in a vein      FLUoxetine (PROzac) 20 mg capsule Pt reported taking 40 mg      gabapentin (NEURONTIN) 400 mg capsule       ipratropium (ATROVENT) 0.06 % nasal spray       irbesartan (AVAPRO) 300 mg tablet Take 1 tablet (300 mg total) by mouth daily at bedtime 30 tablet 12    levothyroxine 175 mcg tablet Take 175 mcg by mouth daily (Patient taking differently: Take 200 mcg by mouth in the morning.)      LORazepam (Ativan) 0.5 mg tablet       pantoprazole (PROTONIX) 40 mg tablet       predniSONE 5 mg tablet Take 5 mg by mouth      predniSONE 50 mg tablet Take one tablet 13 hours, 7 hours, and then 1 hour before procedure. Take 50mg Benadryl with the last prednisone. 3 tablet 0    primidone (MYSOLINE) 50 mg tablet       propranolol (INDERAL) 20 mg tablet Take 1 tablet (20 mg total) by mouth 2 (two) times a day 60 tablet 6    traMADol (Ultram) 50 mg tablet Take by mouth       No current facility-administered medications for this visit.

## 2025-05-22 ENCOUNTER — TELEPHONE (OUTPATIENT)
Age: 75
End: 2025-05-22

## 2025-05-22 NOTE — TELEPHONE ENCOUNTER
Pt has concerns regarding the complexity of the upcoming surgery on 10/16/25.  Due to her cochlear implant, she is asking if Dr. Pena still wants to do the surgery or if she should have the surgery @ [facility].    If Dr. Pena has additional questions, please call the Surgeon;  Chris Bello - Dept. Head /Implant [place] @ [facility] .     Please c/b Maddie @ [phone]

## 2025-05-25 NOTE — ASSESSMENT & PLAN NOTE
Patient with history of vertigo. CTA without any vascular insufficiency. Propranolol helped with vertigo. Potentially from elevated BP. Will increase propranolol to 20 mg bid.

## 2025-05-29 ENCOUNTER — TELEPHONE (OUTPATIENT)
Dept: SURGICAL ONCOLOGY | Facility: CLINIC | Age: 75
End: 2025-05-29

## 2025-05-29 NOTE — TELEPHONE ENCOUNTER
Spoke to patient and gained more information and phone numbers of prior surgeons. I agreed to call and get information about cochlear implant and cervical fusion, and to discuss all of this with Dr Pena. She verbalized understanding that I will contact her once we have some answers. She expressed thanks.

## 2025-05-30 ENCOUNTER — TELEPHONE (OUTPATIENT)
Dept: SURGICAL ONCOLOGY | Facility: CLINIC | Age: 75
End: 2025-05-30

## 2025-05-30 NOTE — TELEPHONE ENCOUNTER
LM for Dr Walker concerning patient's cochlear implant. Asked them to give me a call back to discuss precautions that may need to be taken before her surgery with Dr Pena.

## 2025-06-05 NOTE — TELEPHONE ENCOUNTER
Called back and spoke to someone at office. They took my message and will have someone call back to let us know of any precautions that need to be taken before patient's parathyroidectomy concerning her left sided cochlear implant so as not to damage it.

## 2025-06-12 ENCOUNTER — TELEPHONE (OUTPATIENT)
Dept: SURGICAL ONCOLOGY | Facility: CLINIC | Age: 75
End: 2025-06-12

## 2025-06-12 NOTE — TELEPHONE ENCOUNTER
Patient called, stating she had asked for a call back on her home number. She would like for jet to give her a call back at the [phone]

## 2025-06-12 NOTE — TELEPHONE ENCOUNTER
Patient calling back, stated she had further questions about her surgery and would like a call back from [place] only and did not want to speak to anyone else. Please call her to discuss at [phone]

## 2025-06-12 NOTE — TELEPHONE ENCOUNTER
LM letting patient know that I reached out to [facility] about her cochlear implant and that they sent a letter with information. Dr Pena has reviewed this and it has been scanned to her chart. Hope line number give if she has any other questions.

## 2025-06-12 NOTE — TELEPHONE ENCOUNTER
Made several attempts to call patient and left messages, that I will try again tomorrow if we do not connect today. I asked her to give me a time that might be more likely that I would reach her.

## 2025-06-19 ENCOUNTER — OFFICE VISIT (OUTPATIENT)
Age: 75
End: 2025-06-19
Payer: MEDICARE

## 2025-06-19 VITALS
BODY MASS INDEX: 45.3 KG/M2 | WEIGHT: 210 LBS | DIASTOLIC BLOOD PRESSURE: 82 MMHG | SYSTOLIC BLOOD PRESSURE: 132 MMHG | HEIGHT: 57 IN

## 2025-06-19 DIAGNOSIS — K86.2 PANCREATIC CYST: Primary | ICD-10-CM

## 2025-06-19 PROCEDURE — 99204 OFFICE O/P NEW MOD 45 MIN: CPT | Performed by: INTERNAL MEDICINE

## 2025-06-19 RX ORDER — LEVOTHYROXINE SODIUM 200 UG/1
TABLET ORAL
COMMUNITY
Start: 2025-04-10

## 2025-06-19 NOTE — PROGRESS NOTES
"Name: Maddie Gutierrez      : 1950      MRN: [id number]  Encounter Provider: Demetrio Vasques MD  Encounter Date: 2025   Encounter department: [facility] [place]  :  Assessment & Plan  Pancreatic cyst         Patient is already scheduled for follow-up CT of the abdomen in March.  She has absolutely no symptomatology.  She will see me following this for further suggestions for management    History of Present Illness   HPI  Maddie Gutierrez is a 74 y.o. female who presents with pancreatic cyst seen on CT scan.  She.  Had a chest pain on the right side, almost the flank pain, and underwent CT scan in 2025.  CT scan revealed a 1.4 cm head and 1.2 cm body side chain IPMN.  Patient has absolutely no symptomatology.  Her pain has gone away.  She has no weight loss.  She has pending parathyroid surgery.  She cannot do MRI because of cochlear implant.  She is already scheduled for repeat CT scan at the 1 year [place].  She has no other complaints.      Review of Systems   Constitutional: Negative.    HENT: Negative.     Eyes: Negative.    Respiratory: Negative.     Cardiovascular: Negative.    Gastrointestinal: Negative.    Endocrine: Negative.    Genitourinary: Negative.    Musculoskeletal:  Positive for arthralgias, back pain and gait problem.   Skin: Negative.    Allergic/Immunologic: Negative.    Psychiatric/Behavioral: Negative.          Objective   /82   Ht 4' 9\" (1.448 m)   Wt 95.3 kg (210 lb)   BMI 45.44 kg/m²      Physical Exam  Constitutional:       Appearance: She is obese.   HENT:      Head: Normocephalic.     Eyes:      Pupils: Pupils are equal, round, and reactive to light.       Cardiovascular:      Rate and Rhythm: Normal rate and regular rhythm.      Pulses: Normal pulses.      Heart sounds: Normal heart sounds.   Pulmonary:      Effort: Pulmonary effort is normal.      Breath sounds: Normal breath sounds.   Abdominal:      General: " Abdomen is flat.      Palpations: Abdomen is soft.     Musculoskeletal:      Comments: Uses a walker     Skin:     General: Skin is warm and dry.     Neurological:      General: No focal deficit present.      Mental Status: She is alert and oriented to person, place, and time.      Comments: Resting tremor   Psychiatric:         Mood and Affect: Mood normal.         Behavior: Behavior normal.

## 2025-08-06 ENCOUNTER — TELEPHONE (OUTPATIENT)
Dept: NEUROLOGY | Facility: CLINIC | Age: 75
End: 2025-08-06